# Patient Record
Sex: FEMALE | Race: WHITE | NOT HISPANIC OR LATINO | Employment: OTHER
[De-identification: names, ages, dates, MRNs, and addresses within clinical notes are randomized per-mention and may not be internally consistent; named-entity substitution may affect disease eponyms.]

---

## 2017-01-05 ENCOUNTER — CHARTING TRANS (OUTPATIENT)
Dept: OTHER | Age: 67
End: 2017-01-05

## 2017-01-10 ENCOUNTER — CHARTING TRANS (OUTPATIENT)
Dept: OTHER | Age: 67
End: 2017-01-10

## 2017-01-17 ENCOUNTER — CHARTING TRANS (OUTPATIENT)
Dept: INTERNAL MEDICINE | Age: 67
End: 2017-01-17

## 2017-01-17 ENCOUNTER — IMAGING SERVICES (OUTPATIENT)
Dept: OTHER | Age: 67
End: 2017-01-17

## 2017-01-18 ENCOUNTER — CHARTING TRANS (OUTPATIENT)
Dept: OTHER | Age: 67
End: 2017-01-18

## 2017-01-24 ENCOUNTER — IMAGING SERVICES (OUTPATIENT)
Dept: OTHER | Age: 67
End: 2017-01-24

## 2017-01-27 ENCOUNTER — CHARTING TRANS (OUTPATIENT)
Dept: INTERNAL MEDICINE | Age: 67
End: 2017-01-27

## 2017-01-27 ENCOUNTER — MYAURORA ACCOUNT LINK (OUTPATIENT)
Dept: OTHER | Age: 67
End: 2017-01-27

## 2017-01-27 ENCOUNTER — LAB SERVICES (OUTPATIENT)
Dept: OTHER | Age: 67
End: 2017-01-27

## 2017-01-27 LAB
BUN SERPL-MCNC: 17 MG/DL (ref 7–20)
CALCIUM SERPL-MCNC: 10 MG/DL (ref 8.6–10.6)
CHLORIDE SERPL-SCNC: 107 MMOL/L (ref 96–107)
CREATININE, SERUM: 0.9 MG/DL (ref 0.5–1.4)
GFR SERPL CREATININE-BSD FRML MDRD: >60 ML/MIN/{1.73M2}
GFR SERPL CREATININE-BSD FRML MDRD: >60 ML/MIN/{1.73M2}
GLUCOSE SERPL-MCNC: 109 MG/DL (ref 70–200)
HCO3 SERPL-SCNC: 26 MMOL/L (ref 22–32)
POTASSIUM SERPL-SCNC: 3.7 MMOL/L (ref 3.5–5.3)
SODIUM SERPL-SCNC: 144 MMOL/L (ref 136–146)

## 2017-04-25 ENCOUNTER — LAB SERVICES (OUTPATIENT)
Dept: OTHER | Age: 67
End: 2017-04-25

## 2017-04-25 ENCOUNTER — CHARTING TRANS (OUTPATIENT)
Dept: INTERNAL MEDICINE | Age: 67
End: 2017-04-25

## 2017-04-25 LAB
DIFFERENTIAL TYPE: ABNORMAL
HEMATOCRIT: 41.3 % (ref 34–45)
HEMOGLOBIN: 13.7 G/DL (ref 11.2–15.7)
INTERNATIONAL NORMALIZED RATIO: 1
LYMPH PERCENT: 37.5 % (ref 20.5–51.1)
LYMPHOCYTE ABSOLUTE #: 1.4 10*3/UL (ref 1.2–3.4)
MEAN CORPUSCULAR HGB CONCENTRATION: 33.2 % (ref 32–36)
MEAN CORPUSCULAR HGB: 31.5 PG (ref 27–34)
MEAN CORPUSCULAR VOLUME: 94.9 FL (ref 79–95)
MEAN PLATELET VOLUME: 9.7 FL (ref 8.6–12.4)
MIXED %: 6.8 % (ref 4.3–12.9)
MIXED ABSOLUTE #: 0.2 10*3/UL (ref 0.2–0.9)
NEUTROPHIL ABSOLUTE #: 2 10*3/UL (ref 1.4–6.5)
NEUTROPHIL PERCENT: 55.7 % (ref 34–73.5)
PLATELET COUNT: 254 10*3/UL (ref 150–400)
PROTHROMBIN TIME: 10.3 S (ref 9.8–11.8)
PTT: 27.5 S (ref 24–38)
RED BLOOD CELL COUNT: 4.35 10*6/UL (ref 3.7–5.2)
RED CELL DISTRIBUTION WIDTH: 13.8 % (ref 11.3–14.8)
WHITE BLOOD CELL COUNT: 3.6 10*3/UL (ref 4–10)

## 2017-05-03 ENCOUNTER — CHARTING TRANS (OUTPATIENT)
Dept: OTHER | Age: 67
End: 2017-05-03

## 2017-05-06 ENCOUNTER — CHARTING TRANS (OUTPATIENT)
Dept: URGENT CARE | Age: 67
End: 2017-05-06

## 2017-05-06 ENCOUNTER — MYAURORA ACCOUNT LINK (OUTPATIENT)
Dept: OTHER | Age: 67
End: 2017-05-06

## 2017-05-06 ASSESSMENT — PAIN SCALES - GENERAL: PAINLEVEL_OUTOF10: 5

## 2017-06-02 ENCOUNTER — CHARTING TRANS (OUTPATIENT)
Dept: INTERNAL MEDICINE | Age: 67
End: 2017-06-02

## 2017-06-02 ENCOUNTER — CHARTING TRANS (OUTPATIENT)
Dept: OTHER | Age: 67
End: 2017-06-02

## 2017-06-02 ENCOUNTER — IMAGING SERVICES (OUTPATIENT)
Dept: OTHER | Age: 67
End: 2017-06-02

## 2017-06-26 ENCOUNTER — CHARTING TRANS (OUTPATIENT)
Dept: OTHER | Age: 67
End: 2017-06-26

## 2017-06-26 ENCOUNTER — CHARTING TRANS (OUTPATIENT)
Dept: OTOLARYNGOLOGY | Age: 67
End: 2017-06-26

## 2017-06-26 ENCOUNTER — MYAURORA ACCOUNT LINK (OUTPATIENT)
Dept: OTHER | Age: 67
End: 2017-06-26

## 2017-08-14 ENCOUNTER — CHARTING TRANS (OUTPATIENT)
Dept: URGENT CARE | Age: 67
End: 2017-08-14

## 2017-08-14 ENCOUNTER — MYAURORA ACCOUNT LINK (OUTPATIENT)
Dept: OTHER | Age: 67
End: 2017-08-14

## 2017-08-28 ENCOUNTER — CHARTING TRANS (OUTPATIENT)
Dept: OTHER | Age: 67
End: 2017-08-28

## 2017-08-29 ENCOUNTER — CHARTING TRANS (OUTPATIENT)
Dept: INTERNAL MEDICINE | Age: 67
End: 2017-08-29

## 2017-09-30 ENCOUNTER — CHARTING TRANS (OUTPATIENT)
Dept: INTERNAL MEDICINE | Age: 67
End: 2017-09-30

## 2017-10-19 ENCOUNTER — CHARTING TRANS (OUTPATIENT)
Dept: OTHER | Age: 67
End: 2017-10-19

## 2017-11-09 ENCOUNTER — CHARTING TRANS (OUTPATIENT)
Dept: URGENT CARE | Age: 67
End: 2017-11-09

## 2017-11-09 ENCOUNTER — MYAURORA ACCOUNT LINK (OUTPATIENT)
Dept: OTHER | Age: 67
End: 2017-11-09

## 2017-11-09 ENCOUNTER — CHARTING TRANS (OUTPATIENT)
Dept: OTHER | Age: 67
End: 2017-11-09

## 2017-11-09 ASSESSMENT — PAIN SCALES - GENERAL: PAINLEVEL_OUTOF10: 5

## 2017-11-10 ENCOUNTER — CHARTING TRANS (OUTPATIENT)
Dept: OTHER | Age: 67
End: 2017-11-10

## 2017-11-13 ENCOUNTER — CHARTING TRANS (OUTPATIENT)
Dept: OTHER | Age: 67
End: 2017-11-13

## 2017-11-27 ENCOUNTER — CHARTING TRANS (OUTPATIENT)
Dept: OTHER | Age: 67
End: 2017-11-27

## 2017-11-28 ENCOUNTER — CHARTING TRANS (OUTPATIENT)
Dept: OTHER | Age: 67
End: 2017-11-28

## 2017-12-07 ENCOUNTER — CHARTING TRANS (OUTPATIENT)
Dept: OTHER | Age: 67
End: 2017-12-07

## 2017-12-18 ENCOUNTER — CHARTING TRANS (OUTPATIENT)
Dept: OTHER | Age: 67
End: 2017-12-18

## 2018-01-10 ENCOUNTER — CHARTING TRANS (OUTPATIENT)
Dept: OTHER | Age: 68
End: 2018-01-10

## 2018-01-16 ENCOUNTER — CHARTING TRANS (OUTPATIENT)
Dept: OTHER | Age: 68
End: 2018-01-16

## 2018-01-18 ENCOUNTER — CHARTING TRANS (OUTPATIENT)
Dept: OTHER | Age: 68
End: 2018-01-18

## 2018-01-19 ENCOUNTER — CHARTING TRANS (OUTPATIENT)
Dept: OTHER | Age: 68
End: 2018-01-19

## 2018-01-22 ENCOUNTER — CHARTING TRANS (OUTPATIENT)
Dept: OTHER | Age: 68
End: 2018-01-22

## 2018-01-22 ENCOUNTER — MYAURORA ACCOUNT LINK (OUTPATIENT)
Dept: OTHER | Age: 68
End: 2018-01-22

## 2018-05-21 ENCOUNTER — CHARTING TRANS (OUTPATIENT)
Dept: OTHER | Age: 68
End: 2018-05-21

## 2018-06-06 ENCOUNTER — CHARTING TRANS (OUTPATIENT)
Dept: OTHER | Age: 68
End: 2018-06-06

## 2018-06-06 ENCOUNTER — MYAURORA ACCOUNT LINK (OUTPATIENT)
Dept: OTHER | Age: 68
End: 2018-06-06

## 2018-06-06 ENCOUNTER — LAB SERVICES (OUTPATIENT)
Dept: OTHER | Age: 68
End: 2018-06-06

## 2018-06-06 LAB
BILIRUBIN URINE: NEGATIVE
BLOOD URINE: ABNORMAL
CLARITY: CLEAR
COLOR: YELLOW
GLUCOSE QUALITATIVE U: NEGATIVE
KETONES, URINE: NEGATIVE
LEUKOCYTE ESTERASE URINE: NEGATIVE
MUCOUS: ABNORMAL
NITRITE URINE: NEGATIVE
PH URINE: 6 (ref 5–7)
RED BLOOD CELLS URINE: ABNORMAL (ref 0–3)
SPECIFIC GRAVITY URINE: 1.02 (ref 1–1.03)
SQUAMOUS EPITHELIAL CELLS: ABNORMAL
URINE PROTEIN, QUAL (DIPSTICK): NEGATIVE
UROBILINOGEN URINE: 0.2
WHITE BLOOD CELLS URINE: ABNORMAL (ref 0–5)

## 2018-06-07 ENCOUNTER — CHARTING TRANS (OUTPATIENT)
Dept: OTHER | Age: 68
End: 2018-06-07

## 2018-06-08 LAB — FINAL REPORT: NORMAL

## 2018-06-11 ENCOUNTER — CHARTING TRANS (OUTPATIENT)
Dept: OTHER | Age: 68
End: 2018-06-11

## 2018-06-12 ENCOUNTER — CHARTING TRANS (OUTPATIENT)
Dept: OTHER | Age: 68
End: 2018-06-12

## 2018-06-13 PROBLEM — N13.2 URETERAL STONE WITH HYDRONEPHROSIS: Status: ACTIVE | Noted: 2018-06-13

## 2018-06-13 PROBLEM — N20.0 KIDNEY STONE: Status: ACTIVE | Noted: 2018-06-13

## 2018-06-14 ENCOUNTER — LAB SERVICES (OUTPATIENT)
Dept: OTHER | Age: 68
End: 2018-06-14

## 2018-06-14 LAB
ALBUMIN SERPL BCG-MCNC: 4.7 G/DL (ref 3.6–5.1)
ALP SERPL-CCNC: 52 U/L (ref 45–130)
ALT SERPL W/O P-5'-P-CCNC: 15 U/L (ref 7–34)
AST SERPL-CCNC: 14 U/L (ref 9–37)
BILIRUB SERPL-MCNC: 0.2 MG/DL (ref 0–1)
BUN SERPL-MCNC: 17 MG/DL (ref 7–20)
CALCIUM SERPL-MCNC: 9 MG/DL (ref 8.6–10.6)
CHLORIDE SERPL-SCNC: 107 MMOL/L (ref 96–107)
CHOLEST SERPL-MCNC: 247 MG/DL (ref 140–200)
CREAT SERPL-MCNC: 0.9 MG/DL (ref 0.5–1.4)
GFR SERPL CREATININE-BSD FRML MDRD: 59 ML/MIN/{1.73M2}
GFR SERPL CREATININE-BSD FRML MDRD: >60 ML/MIN/{1.73M2}
GLUCOSE P FAST SERPL-MCNC: 116 MG/DL (ref 60–100)
HCO3 SERPL-SCNC: 27 MMOL/L (ref 22–32)
HDLC SERPL-MCNC: 101 MG/DL
LDLC SERPL CALC-MCNC: 127 MG/DL (ref 0–100)
POTASSIUM SERPL-SCNC: 4.4 MMOL/L (ref 3.5–5.3)
PROT SERPL-MCNC: 6.7 G/DL (ref 6.2–8.1)
SODIUM SERPL-SCNC: 143 MMOL/L (ref 136–146)
TRIGL SERPL-MCNC: 90 MG/DL (ref 0–200)

## 2018-06-20 ENCOUNTER — LAB SERVICES (OUTPATIENT)
Dept: OTHER | Age: 68
End: 2018-06-20

## 2018-06-20 LAB
EST. AVERAGE GLUCOSE BLD GHB EST-MCNC: 121 MG/DL (ref 0–154)
HBA1C BLD-MCNC: 5.8 % (ref 4.2–6)

## 2018-06-26 ENCOUNTER — CHARTING TRANS (OUTPATIENT)
Dept: OTHER | Age: 68
End: 2018-06-26

## 2018-07-11 ENCOUNTER — LAB SERVICES (OUTPATIENT)
Dept: OTHER | Age: 68
End: 2018-07-11

## 2018-07-11 LAB
DIFFERENTIAL TYPE: ABNORMAL
HEMATOCRIT: 41.3 % (ref 34–45)
HEMOGLOBIN: 13.6 G/DL (ref 11.2–15.7)
LYMPH PERCENT: 30.8 % (ref 20.5–51.1)
LYMPHOCYTE ABSOLUTE #: 1.7 10*3/UL (ref 1.2–3.4)
MEAN CORPUSCULAR HGB CONCENTRATION: 32.9 % (ref 32–36)
MEAN CORPUSCULAR HGB: 31.9 PG (ref 27–34)
MEAN CORPUSCULAR VOLUME: 96.7 FL (ref 79–95)
MEAN PLATELET VOLUME: 9.7 FL (ref 8.6–12.4)
MIXED %: 6.3 % (ref 4.3–12.9)
MIXED ABSOLUTE #: 0.4 10*3/UL (ref 0.2–0.9)
NEUTROPHIL ABSOLUTE #: 3.5 10*3/UL (ref 1.4–6.5)
NEUTROPHIL PERCENT: 62.9 % (ref 34–73.5)
PLATELET COUNT: 240 10*3/UL (ref 150–400)
RED BLOOD CELL COUNT: 4.27 10*6/UL (ref 3.7–5.2)
RED CELL DISTRIBUTION WIDTH: 13.7 % (ref 11.3–14.8)
WHITE BLOOD CELL COUNT: 5.6 10*3/UL (ref 4–10)

## 2018-08-01 ENCOUNTER — LAB SERVICES (OUTPATIENT)
Dept: OTHER | Age: 68
End: 2018-08-01

## 2018-08-01 ENCOUNTER — MYAURORA ACCOUNT LINK (OUTPATIENT)
Dept: OTHER | Age: 68
End: 2018-08-01

## 2018-08-01 ENCOUNTER — CHARTING TRANS (OUTPATIENT)
Dept: OTHER | Age: 68
End: 2018-08-01

## 2018-08-03 LAB — AP REPORT: NORMAL

## 2018-11-16 ENCOUNTER — CHARTING TRANS (OUTPATIENT)
Dept: OTHER | Age: 68
End: 2018-11-16

## 2018-11-23 ENCOUNTER — IMAGING SERVICES (OUTPATIENT)
Dept: OTHER | Age: 68
End: 2018-11-23

## 2018-11-27 VITALS
OXYGEN SATURATION: 100 % | TEMPERATURE: 99.4 F | HEART RATE: 106 BPM | SYSTOLIC BLOOD PRESSURE: 129 MMHG | RESPIRATION RATE: 16 BRPM | DIASTOLIC BLOOD PRESSURE: 65 MMHG

## 2018-11-28 ENCOUNTER — MYAURORA ACCOUNT LINK (OUTPATIENT)
Dept: OTHER | Age: 68
End: 2018-11-28

## 2018-11-28 ENCOUNTER — CHARTING TRANS (OUTPATIENT)
Dept: OTHER | Age: 68
End: 2018-11-28

## 2018-11-28 VITALS
HEART RATE: 100 BPM | BODY MASS INDEX: 25.68 KG/M2 | TEMPERATURE: 97.7 F | DIASTOLIC BLOOD PRESSURE: 60 MMHG | SYSTOLIC BLOOD PRESSURE: 114 MMHG | WEIGHT: 136 LBS | HEIGHT: 61 IN

## 2018-11-28 VITALS
DIASTOLIC BLOOD PRESSURE: 64 MMHG | WEIGHT: 139 LBS | SYSTOLIC BLOOD PRESSURE: 130 MMHG | HEART RATE: 88 BPM | TEMPERATURE: 97.9 F | BODY MASS INDEX: 24.63 KG/M2 | HEIGHT: 63 IN | RESPIRATION RATE: 14 BRPM

## 2018-11-28 VITALS
HEART RATE: 100 BPM | HEIGHT: 61 IN | WEIGHT: 138 LBS | DIASTOLIC BLOOD PRESSURE: 60 MMHG | SYSTOLIC BLOOD PRESSURE: 110 MMHG | TEMPERATURE: 97.1 F | BODY MASS INDEX: 26.06 KG/M2

## 2018-11-28 VITALS
HEIGHT: 59 IN | DIASTOLIC BLOOD PRESSURE: 60 MMHG | HEART RATE: 92 BPM | SYSTOLIC BLOOD PRESSURE: 124 MMHG | TEMPERATURE: 96.6 F | BODY MASS INDEX: 28.43 KG/M2 | WEIGHT: 141 LBS

## 2018-11-28 VITALS
HEART RATE: 90 BPM | TEMPERATURE: 99.5 F | SYSTOLIC BLOOD PRESSURE: 123 MMHG | DIASTOLIC BLOOD PRESSURE: 70 MMHG | RESPIRATION RATE: 16 BRPM | OXYGEN SATURATION: 96 %

## 2018-11-28 VITALS — WEIGHT: 138 LBS | BODY MASS INDEX: 24.45 KG/M2 | HEIGHT: 63 IN

## 2018-11-28 VITALS
HEART RATE: 96 BPM | TEMPERATURE: 98.8 F | OXYGEN SATURATION: 99 % | SYSTOLIC BLOOD PRESSURE: 130 MMHG | RESPIRATION RATE: 16 BRPM | DIASTOLIC BLOOD PRESSURE: 70 MMHG

## 2018-11-29 ENCOUNTER — CHARTING TRANS (OUTPATIENT)
Dept: OTHER | Age: 68
End: 2018-11-29

## 2018-11-29 VITALS
HEART RATE: 80 BPM | HEIGHT: 61 IN | SYSTOLIC BLOOD PRESSURE: 122 MMHG | TEMPERATURE: 96 F | DIASTOLIC BLOOD PRESSURE: 64 MMHG | WEIGHT: 138 LBS | BODY MASS INDEX: 26.06 KG/M2

## 2018-11-29 VITALS
HEART RATE: 80 BPM | BODY MASS INDEX: 26.24 KG/M2 | SYSTOLIC BLOOD PRESSURE: 120 MMHG | HEIGHT: 61 IN | TEMPERATURE: 97.2 F | WEIGHT: 139 LBS | DIASTOLIC BLOOD PRESSURE: 62 MMHG

## 2018-12-04 VITALS — BODY MASS INDEX: 31.94 KG/M2 | HEIGHT: 55 IN | WEIGHT: 138 LBS

## 2019-01-01 ENCOUNTER — EXTERNAL RECORD (OUTPATIENT)
Dept: HEALTH INFORMATION MANAGEMENT | Facility: OTHER | Age: 69
End: 2019-01-01

## 2019-01-04 ENCOUNTER — TELEPHONE (OUTPATIENT)
Dept: OTOLARYNGOLOGY | Age: 69
End: 2019-01-04

## 2019-01-10 ENCOUNTER — OFFICE VISIT (OUTPATIENT)
Dept: OTOLARYNGOLOGY | Age: 69
End: 2019-01-10

## 2019-01-10 VITALS — HEIGHT: 59 IN | WEIGHT: 138 LBS | BODY MASS INDEX: 27.82 KG/M2

## 2019-01-10 DIAGNOSIS — H60.8X1 OTHER OTITIS EXTERNA, RIGHT EAR: ICD-10-CM

## 2019-01-10 DIAGNOSIS — D48.5 NEOPLASM OF UNCERTAIN BEHAVIOR OF SKIN: ICD-10-CM

## 2019-01-10 DIAGNOSIS — H61.899 LESION OF EAR CANAL: Primary | ICD-10-CM

## 2019-01-10 DIAGNOSIS — H93.8X1 OTHER SPECIFIED DISORDERS OF RIGHT EAR: ICD-10-CM

## 2019-01-10 PROCEDURE — 92504 EAR MICROSCOPY EXAMINATION: CPT | Performed by: OTOLARYNGOLOGY

## 2019-01-10 PROCEDURE — 99203 OFFICE O/P NEW LOW 30 MIN: CPT | Performed by: OTOLARYNGOLOGY

## 2019-01-10 PROCEDURE — 69105 BIOPSY OF EXTERNAL EAR CANAL: CPT | Performed by: OTOLARYNGOLOGY

## 2019-01-10 RX ORDER — GABAPENTIN 300 MG/1
300 CAPSULE ORAL
COMMUNITY
Start: 2016-08-22

## 2019-01-10 RX ORDER — ALENDRONATE SODIUM 70 MG/1
TABLET ORAL
COMMUNITY
End: 2023-01-11 | Stop reason: ALTCHOICE

## 2019-01-10 RX ORDER — LIDOCAINE 50 MG/G
5 PATCH TOPICAL
COMMUNITY
Start: 2016-07-26 | End: 2020-07-06 | Stop reason: ALTCHOICE

## 2019-01-10 RX ORDER — PANTOPRAZOLE SODIUM 40 MG/1
40 TABLET, DELAYED RELEASE ORAL
COMMUNITY
Start: 2013-03-13

## 2019-01-10 RX ORDER — DEXTROAMPHETAMINE SACCHARATE, AMPHETAMINE ASPARTATE MONOHYDRATE, DEXTROAMPHETAMINE SULFATE AND AMPHETAMINE SULFATE 7.5; 7.5; 7.5; 7.5 MG/1; MG/1; MG/1; MG/1
CAPSULE, EXTENDED RELEASE ORAL
COMMUNITY
Start: 2016-08-26

## 2019-01-10 RX ORDER — FLUTICASONE PROPIONATE 50 MCG
1 SPRAY, SUSPENSION (ML) NASAL
COMMUNITY
Start: 2017-04-25 | End: 2020-07-06 | Stop reason: ALTCHOICE

## 2019-01-10 RX ORDER — SUMATRIPTAN 100 MG/1
50 TABLET, FILM COATED ORAL
COMMUNITY
Start: 2016-07-30

## 2019-01-10 RX ORDER — OXCARBAZEPINE 150 MG/1
TABLET, FILM COATED ORAL
COMMUNITY
Start: 2018-11-16 | End: 2023-01-11 | Stop reason: ALTCHOICE

## 2019-01-10 RX ORDER — TOPIRAMATE 25 MG/1
TABLET ORAL
COMMUNITY
Start: 2016-03-29

## 2019-01-14 ENCOUNTER — OFFICE VISIT (OUTPATIENT)
Dept: OTOLARYNGOLOGY | Age: 69
End: 2019-01-14

## 2019-01-14 ENCOUNTER — TELEPHONE (OUTPATIENT)
Dept: OTOLARYNGOLOGY | Age: 69
End: 2019-01-14

## 2019-01-14 DIAGNOSIS — H61.899 LESION OF EAR CANAL: Primary | ICD-10-CM

## 2019-01-14 LAB — AP REPORT: NORMAL

## 2019-01-14 PROCEDURE — 92504 EAR MICROSCOPY EXAMINATION: CPT | Performed by: OTOLARYNGOLOGY

## 2019-01-14 PROCEDURE — 99214 OFFICE O/P EST MOD 30 MIN: CPT | Performed by: OTOLARYNGOLOGY

## 2019-01-14 RX ORDER — OFLOXACIN 3 MG/ML
4 SOLUTION AURICULAR (OTIC) DAILY
Qty: 1 BOTTLE | Refills: 0 | Status: SHIPPED | OUTPATIENT
Start: 2019-01-14 | End: 2019-01-21

## 2019-01-14 RX ORDER — HYDROCORTISONE VALERATE 2 MG/G
OINTMENT TOPICAL
COMMUNITY
Start: 2017-11-13 | End: 2023-01-11 | Stop reason: ALTCHOICE

## 2019-01-14 RX ORDER — AMANTADINE HYDROCHLORIDE 100 MG/1
100 CAPSULE, GELATIN COATED ORAL
COMMUNITY
End: 2020-07-06 | Stop reason: ALTCHOICE

## 2019-01-14 RX ORDER — BACLOFEN 10 MG/1
10 TABLET ORAL
COMMUNITY
End: 2020-07-06 | Stop reason: ALTCHOICE

## 2019-01-18 ENCOUNTER — APPOINTMENT (OUTPATIENT)
Dept: OTOLARYNGOLOGY | Age: 69
End: 2019-01-18

## 2019-01-18 ENCOUNTER — OFFICE VISIT (OUTPATIENT)
Dept: OTOLARYNGOLOGY | Age: 69
End: 2019-01-18

## 2019-01-18 VITALS — BODY MASS INDEX: 27.82 KG/M2 | HEIGHT: 59 IN | WEIGHT: 138 LBS

## 2019-01-18 DIAGNOSIS — H61.899 LESION OF EAR CANAL: Primary | ICD-10-CM

## 2019-01-18 DIAGNOSIS — L03.90 CELLULITIS, UNSPECIFIED CELLULITIS SITE: ICD-10-CM

## 2019-01-18 PROCEDURE — 99213 OFFICE O/P EST LOW 20 MIN: CPT | Performed by: OTOLARYNGOLOGY

## 2019-01-18 PROCEDURE — 92504 EAR MICROSCOPY EXAMINATION: CPT | Performed by: OTOLARYNGOLOGY

## 2019-01-21 ENCOUNTER — TELEPHONE (OUTPATIENT)
Dept: OTOLARYNGOLOGY | Age: 69
End: 2019-01-21

## 2019-01-21 DIAGNOSIS — H61.899 LESION OF EAR CANAL: Primary | ICD-10-CM

## 2019-01-24 ENCOUNTER — TELEPHONE (OUTPATIENT)
Dept: OTOLARYNGOLOGY | Age: 69
End: 2019-01-24

## 2019-01-25 ENCOUNTER — OFFICE VISIT (OUTPATIENT)
Dept: OTOLARYNGOLOGY | Age: 69
End: 2019-01-25

## 2019-01-25 VITALS — WEIGHT: 138 LBS | BODY MASS INDEX: 27.82 KG/M2 | HEIGHT: 59 IN

## 2019-01-25 DIAGNOSIS — H61.899 LESION OF EAR CANAL: Primary | ICD-10-CM

## 2019-01-25 PROCEDURE — 99213 OFFICE O/P EST LOW 20 MIN: CPT | Performed by: OTOLARYNGOLOGY

## 2019-01-25 PROCEDURE — 92504 EAR MICROSCOPY EXAMINATION: CPT | Performed by: OTOLARYNGOLOGY

## 2019-02-01 ENCOUNTER — OFFICE VISIT (OUTPATIENT)
Dept: OTOLARYNGOLOGY | Age: 69
End: 2019-02-01

## 2019-02-01 VITALS — WEIGHT: 138 LBS | BODY MASS INDEX: 27.87 KG/M2

## 2019-02-01 DIAGNOSIS — H61.899 LESION OF EAR CANAL: Primary | ICD-10-CM

## 2019-02-01 PROCEDURE — 92504 EAR MICROSCOPY EXAMINATION: CPT | Performed by: OTOLARYNGOLOGY

## 2019-02-01 PROCEDURE — 99213 OFFICE O/P EST LOW 20 MIN: CPT | Performed by: OTOLARYNGOLOGY

## 2019-02-11 ENCOUNTER — IMAGING SERVICES (OUTPATIENT)
Dept: OTHER | Age: 69
End: 2019-02-11

## 2019-02-18 ENCOUNTER — OFFICE VISIT (OUTPATIENT)
Dept: OTOLARYNGOLOGY | Age: 69
End: 2019-02-18

## 2019-02-18 VITALS — WEIGHT: 138 LBS | HEIGHT: 59 IN | BODY MASS INDEX: 27.82 KG/M2

## 2019-02-18 DIAGNOSIS — H61.899 LESION OF EAR CANAL: Primary | ICD-10-CM

## 2019-02-18 PROCEDURE — 99212 OFFICE O/P EST SF 10 MIN: CPT | Performed by: OTOLARYNGOLOGY

## 2019-02-18 PROCEDURE — 92504 EAR MICROSCOPY EXAMINATION: CPT | Performed by: OTOLARYNGOLOGY

## 2019-03-05 VITALS — BODY MASS INDEX: 27.88 KG/M2 | HEIGHT: 60 IN | WEIGHT: 142 LBS

## 2019-03-08 ENCOUNTER — OFFICE VISIT (OUTPATIENT)
Dept: OTOLARYNGOLOGY | Age: 69
End: 2019-03-08

## 2019-03-08 VITALS — HEIGHT: 59 IN | WEIGHT: 138 LBS | BODY MASS INDEX: 27.82 KG/M2

## 2019-03-08 DIAGNOSIS — H93.90 EAR LESION: Primary | ICD-10-CM

## 2019-03-08 PROCEDURE — 92504 EAR MICROSCOPY EXAMINATION: CPT | Performed by: OTOLARYNGOLOGY

## 2019-03-08 PROCEDURE — 99213 OFFICE O/P EST LOW 20 MIN: CPT | Performed by: OTOLARYNGOLOGY

## 2019-05-13 ENCOUNTER — OFFICE VISIT (OUTPATIENT)
Dept: OTOLARYNGOLOGY | Age: 69
End: 2019-05-13

## 2019-05-13 VITALS — HEIGHT: 59 IN | WEIGHT: 138 LBS | BODY MASS INDEX: 27.82 KG/M2

## 2019-05-13 DIAGNOSIS — H61.899 LESION OF EAR CANAL: Primary | ICD-10-CM

## 2019-05-13 PROCEDURE — 92504 EAR MICROSCOPY EXAMINATION: CPT | Performed by: OTOLARYNGOLOGY

## 2019-05-13 PROCEDURE — 99213 OFFICE O/P EST LOW 20 MIN: CPT | Performed by: OTOLARYNGOLOGY

## 2019-05-20 ENCOUNTER — WALK IN (OUTPATIENT)
Dept: URGENT CARE | Age: 69
End: 2019-05-20

## 2019-05-20 ENCOUNTER — IMAGING SERVICES (OUTPATIENT)
Dept: GENERAL RADIOLOGY | Age: 69
End: 2019-05-20
Attending: INTERNAL MEDICINE

## 2019-05-20 ENCOUNTER — TELEPHONE (OUTPATIENT)
Dept: INTERNAL MEDICINE | Age: 69
End: 2019-05-20

## 2019-05-20 DIAGNOSIS — M79.671 RIGHT FOOT PAIN: Primary | ICD-10-CM

## 2019-05-20 DIAGNOSIS — M79.671 RIGHT FOOT PAIN: ICD-10-CM

## 2019-05-20 PROCEDURE — 73630 X-RAY EXAM OF FOOT: CPT | Performed by: RADIOLOGY

## 2019-05-20 PROCEDURE — 99214 OFFICE O/P EST MOD 30 MIN: CPT | Performed by: INTERNAL MEDICINE

## 2019-05-20 RX ORDER — PREDNISONE 50 MG/1
50 TABLET ORAL DAILY
Qty: 5 TABLET | Refills: 0 | Status: SHIPPED | OUTPATIENT
Start: 2019-05-20 | End: 2019-05-25

## 2019-05-20 ASSESSMENT — PAIN SCALES - GENERAL: PAINLEVEL: 3-4

## 2019-05-31 SDOH — HEALTH STABILITY: MENTAL HEALTH: HOW OFTEN DO YOU HAVE A DRINK CONTAINING ALCOHOL?: NEVER

## 2019-06-03 ENCOUNTER — OFFICE VISIT (OUTPATIENT)
Dept: INTERNAL MEDICINE | Age: 69
End: 2019-06-03

## 2019-06-03 VITALS
WEIGHT: 138 LBS | TEMPERATURE: 97.5 F | SYSTOLIC BLOOD PRESSURE: 120 MMHG | RESPIRATION RATE: 20 BRPM | BODY MASS INDEX: 27.82 KG/M2 | HEART RATE: 84 BPM | DIASTOLIC BLOOD PRESSURE: 60 MMHG | HEIGHT: 59 IN

## 2019-06-03 DIAGNOSIS — M79.671 RIGHT FOOT PAIN: Primary | ICD-10-CM

## 2019-06-03 PROCEDURE — 99213 OFFICE O/P EST LOW 20 MIN: CPT | Performed by: INTERNAL MEDICINE

## 2019-06-10 ENCOUNTER — OFFICE VISIT (OUTPATIENT)
Dept: INTERNAL MEDICINE | Age: 69
End: 2019-06-10

## 2019-06-10 VITALS
BODY MASS INDEX: 27.82 KG/M2 | TEMPERATURE: 97.7 F | OXYGEN SATURATION: 97 % | SYSTOLIC BLOOD PRESSURE: 128 MMHG | RESPIRATION RATE: 16 BRPM | WEIGHT: 138 LBS | DIASTOLIC BLOOD PRESSURE: 68 MMHG | HEART RATE: 96 BPM | HEIGHT: 59 IN

## 2019-06-10 DIAGNOSIS — R73.01 IMPAIRED FASTING BLOOD SUGAR: ICD-10-CM

## 2019-06-10 DIAGNOSIS — E78.5 HYPERLIPIDEMIA, UNSPECIFIED HYPERLIPIDEMIA TYPE: ICD-10-CM

## 2019-06-10 DIAGNOSIS — R53.83 FATIGUE, UNSPECIFIED TYPE: ICD-10-CM

## 2019-06-10 DIAGNOSIS — Z00.00 MEDICARE ANNUAL WELLNESS VISIT, SUBSEQUENT: Primary | ICD-10-CM

## 2019-06-10 PROCEDURE — G0439 PPPS, SUBSEQ VISIT: HCPCS | Performed by: INTERNAL MEDICINE

## 2019-06-10 SDOH — HEALTH STABILITY: MENTAL HEALTH: HOW OFTEN DO YOU HAVE A DRINK CONTAINING ALCOHOL?: NEVER

## 2019-06-24 ENCOUNTER — OFFICE VISIT (OUTPATIENT)
Dept: PODIATRY | Age: 69
End: 2019-06-24
Attending: INTERNAL MEDICINE

## 2019-06-24 VITALS — BODY MASS INDEX: 27.09 KG/M2 | WEIGHT: 138 LBS | HEIGHT: 60 IN

## 2019-06-24 DIAGNOSIS — M84.30XA STRESS REACTION OF BONE: Primary | ICD-10-CM

## 2019-06-24 DIAGNOSIS — M79.671 RIGHT FOOT PAIN: ICD-10-CM

## 2019-06-24 PROCEDURE — L3260 AMBULATORY SURGICAL BOOT EAC: HCPCS | Performed by: PODIATRIST

## 2019-06-24 PROCEDURE — 29540 STRAPPING ANKLE &/FOOT: CPT | Performed by: PODIATRIST

## 2019-06-24 PROCEDURE — 99203 OFFICE O/P NEW LOW 30 MIN: CPT | Performed by: PODIATRIST

## 2019-07-17 ENCOUNTER — IMAGING SERVICES (OUTPATIENT)
Dept: GENERAL RADIOLOGY | Age: 69
End: 2019-07-17
Attending: PODIATRIST

## 2019-07-17 ENCOUNTER — OFFICE VISIT (OUTPATIENT)
Dept: PODIATRY | Age: 69
End: 2019-07-17

## 2019-07-17 DIAGNOSIS — M79.675 PAIN IN TOE OF LEFT FOOT: ICD-10-CM

## 2019-07-17 DIAGNOSIS — M79.674 PAIN IN TOES OF BOTH FEET: ICD-10-CM

## 2019-07-17 DIAGNOSIS — S92.502A FRACTURE OF FOURTH TOE, LEFT, CLOSED, INITIAL ENCOUNTER: Primary | ICD-10-CM

## 2019-07-17 DIAGNOSIS — M79.675 PAIN IN TOES OF BOTH FEET: ICD-10-CM

## 2019-07-17 DIAGNOSIS — M84.30XA STRESS REACTION OF BONE: ICD-10-CM

## 2019-07-17 PROCEDURE — 28510 TREATMENT OF TOE FRACTURE: CPT | Performed by: PODIATRIST

## 2019-07-17 PROCEDURE — 99213 OFFICE O/P EST LOW 20 MIN: CPT | Performed by: PODIATRIST

## 2019-07-17 PROCEDURE — 73660 X-RAY EXAM OF TOE(S): CPT | Performed by: RADIOLOGY

## 2019-07-18 ENCOUNTER — TELEPHONE (OUTPATIENT)
Dept: INTERNAL MEDICINE | Age: 69
End: 2019-07-18

## 2019-08-16 ENCOUNTER — OFFICE VISIT (OUTPATIENT)
Dept: OTOLARYNGOLOGY | Age: 69
End: 2019-08-16

## 2019-08-16 ENCOUNTER — EXTERNAL RECORD (OUTPATIENT)
Dept: HEALTH INFORMATION MANAGEMENT | Facility: OTHER | Age: 69
End: 2019-08-16

## 2019-08-16 VITALS — WEIGHT: 138 LBS | BODY MASS INDEX: 26.95 KG/M2

## 2019-08-16 DIAGNOSIS — H92.09 OTALGIA, UNSPECIFIED LATERALITY: Primary | ICD-10-CM

## 2019-08-16 DIAGNOSIS — R51.9 NONINTRACTABLE HEADACHE, UNSPECIFIED CHRONICITY PATTERN, UNSPECIFIED HEADACHE TYPE: ICD-10-CM

## 2019-08-16 PROCEDURE — 99213 OFFICE O/P EST LOW 20 MIN: CPT | Performed by: OTOLARYNGOLOGY

## 2019-08-16 PROCEDURE — 92504 EAR MICROSCOPY EXAMINATION: CPT | Performed by: OTOLARYNGOLOGY

## 2019-08-28 ENCOUNTER — OFFICE VISIT (OUTPATIENT)
Dept: PODIATRY | Age: 69
End: 2019-08-28

## 2019-08-28 ENCOUNTER — TELEPHONE (OUTPATIENT)
Dept: INTERNAL MEDICINE | Age: 69
End: 2019-08-28

## 2019-08-28 ENCOUNTER — LAB SERVICES (OUTPATIENT)
Dept: LAB | Age: 69
End: 2019-08-28

## 2019-08-28 VITALS — WEIGHT: 138 LBS | BODY MASS INDEX: 26.95 KG/M2

## 2019-08-28 DIAGNOSIS — M84.30XA STRESS REACTION OF BONE: ICD-10-CM

## 2019-08-28 DIAGNOSIS — M79.674 PAIN IN TOES OF BOTH FEET: ICD-10-CM

## 2019-08-28 DIAGNOSIS — M79.675 PAIN IN TOES OF BOTH FEET: ICD-10-CM

## 2019-08-28 DIAGNOSIS — E78.5 HYPERLIPIDEMIA, UNSPECIFIED HYPERLIPIDEMIA TYPE: ICD-10-CM

## 2019-08-28 DIAGNOSIS — M79.675 PAIN IN TOE OF LEFT FOOT: ICD-10-CM

## 2019-08-28 DIAGNOSIS — R73.01 IMPAIRED FASTING BLOOD SUGAR: ICD-10-CM

## 2019-08-28 DIAGNOSIS — S92.502A FRACTURE OF FOURTH TOE, LEFT, CLOSED, INITIAL ENCOUNTER: Primary | ICD-10-CM

## 2019-08-28 DIAGNOSIS — R53.83 FATIGUE, UNSPECIFIED TYPE: ICD-10-CM

## 2019-08-28 DIAGNOSIS — M79.671 RIGHT FOOT PAIN: ICD-10-CM

## 2019-08-28 LAB
ALBUMIN SERPL BCG-MCNC: 4.2 G/DL (ref 3.6–5.1)
ALP SERPL-CCNC: 52 U/L (ref 45–130)
ALT SERPL W/O P-5'-P-CCNC: 14 U/L (ref 7–34)
AST SERPL-CCNC: 15 U/L (ref 9–37)
BILIRUB SERPL-MCNC: 0.3 MG/DL (ref 0–1)
BUN SERPL-MCNC: 15 MG/DL (ref 7–20)
CALCIUM SERPL-MCNC: 9 MG/DL (ref 8.6–10.6)
CHLORIDE SERPL-SCNC: 108 MMOL/L (ref 96–107)
CHOLEST SERPL-MCNC: 236 MG/DL (ref 140–200)
CREAT SERPL-MCNC: 0.9 MG/DL (ref 0.5–1.4)
DIFFERENTIAL TYPE: ABNORMAL
EST. AVERAGE GLUCOSE BLD GHB EST-MCNC: 121 MG/DL (ref 0–154)
GFR SERPL CREATININE-BSD FRML MDRD: >60 ML/MIN/{1.73M2}
GFR SERPL CREATININE-BSD FRML MDRD: >60 ML/MIN/{1.73M2}
GLUCOSE P FAST SERPL-MCNC: 104 MG/DL (ref 60–100)
HBA1C MFR BLD: 5.9 % (ref 4.2–6)
HCO3 SERPL-SCNC: 26 MMOL/L (ref 22–32)
HDLC SERPL-MCNC: 96 MG/DL
HEMATOCRIT: 42.8 % (ref 34–45)
HEMOGLOBIN: 13.4 G/DL (ref 11.2–15.7)
LDLC SERPL CALC-MCNC: 125 MG/DL (ref 0–100)
LYMPH PERCENT: 30.7 % (ref 20.5–51.1)
LYMPHOCYTE ABSOLUTE #: 1.6 10*3/UL (ref 1.2–3.4)
MEAN CORPUSCULAR HGB CONCENTRATION: 31.3 % (ref 32–36)
MEAN CORPUSCULAR HGB: 31.2 PG (ref 27–34)
MEAN CORPUSCULAR VOLUME: 99.8 FL (ref 79–95)
MEAN PLATELET VOLUME: 9.7 FL (ref 8.6–12.4)
MIXED %: 10.3 % (ref 4.3–12.9)
MIXED ABSOLUTE #: 0.5 10*3/UL (ref 0.2–0.9)
NEUTROPHIL ABSOLUTE #: 3 10*3/UL (ref 1.4–6.5)
NEUTROPHIL PERCENT: 59 % (ref 34–73.5)
PLATELET COUNT: 269 10*3/UL (ref 150–400)
POTASSIUM SERPL-SCNC: 4.1 MMOL/L (ref 3.5–5.3)
PROT SERPL-MCNC: 6.2 G/DL (ref 6.2–8.1)
RED BLOOD CELL COUNT: 4.29 10*6/UL (ref 3.7–5.2)
RED CELL DISTRIBUTION WIDTH: 13.1 % (ref 11.3–14.8)
SODIUM SERPL-SCNC: 143 MMOL/L (ref 136–146)
TRIGL SERPL-MCNC: 71 MG/DL (ref 0–200)
TSH SERPL DL<=0.05 MIU/L-ACNC: 1.76 M[IU]/L (ref 0.3–4.82)
WHITE BLOOD CELL COUNT: 5.1 10*3/UL (ref 4–10)

## 2019-08-28 PROCEDURE — 99024 POSTOP FOLLOW-UP VISIT: CPT | Performed by: PODIATRIST

## 2019-08-28 PROCEDURE — 84443 ASSAY THYROID STIM HORMONE: CPT | Performed by: INTERNAL MEDICINE

## 2019-08-28 PROCEDURE — 80053 COMPREHEN METABOLIC PANEL: CPT | Performed by: INTERNAL MEDICINE

## 2019-08-28 PROCEDURE — 80061 LIPID PANEL: CPT | Performed by: INTERNAL MEDICINE

## 2019-08-28 PROCEDURE — 85025 COMPLETE CBC W/AUTO DIFF WBC: CPT | Performed by: INTERNAL MEDICINE

## 2019-08-28 PROCEDURE — 36415 COLL VENOUS BLD VENIPUNCTURE: CPT | Performed by: INTERNAL MEDICINE

## 2019-08-28 PROCEDURE — 83036 HEMOGLOBIN GLYCOSYLATED A1C: CPT | Performed by: INTERNAL MEDICINE

## 2019-10-14 ENCOUNTER — OFFICE VISIT (OUTPATIENT)
Dept: OTOLARYNGOLOGY | Age: 69
End: 2019-10-14

## 2019-10-14 VITALS — HEIGHT: 60 IN | BODY MASS INDEX: 27.09 KG/M2 | WEIGHT: 138 LBS

## 2019-10-14 DIAGNOSIS — H93.90 EAR LESION: ICD-10-CM

## 2019-10-14 DIAGNOSIS — H92.09 OTALGIA, UNSPECIFIED LATERALITY: Primary | ICD-10-CM

## 2019-10-14 PROCEDURE — 92504 EAR MICROSCOPY EXAMINATION: CPT | Performed by: OTOLARYNGOLOGY

## 2019-10-14 PROCEDURE — 99214 OFFICE O/P EST MOD 30 MIN: CPT | Performed by: OTOLARYNGOLOGY

## 2020-01-08 ENCOUNTER — EXTERNAL RECORD (OUTPATIENT)
Dept: HEALTH INFORMATION MANAGEMENT | Facility: OTHER | Age: 70
End: 2020-01-08

## 2020-06-04 RX ORDER — VALACYCLOVIR HYDROCHLORIDE 1 G/1
TABLET, FILM COATED ORAL
COMMUNITY
End: 2020-07-06 | Stop reason: ALTCHOICE

## 2020-06-04 RX ORDER — PREDNISONE 10 MG/1
TABLET ORAL
COMMUNITY
End: 2020-07-06 | Stop reason: ALTCHOICE

## 2020-06-04 RX ORDER — TRAMADOL HYDROCHLORIDE 50 MG/1
TABLET ORAL
COMMUNITY
End: 2020-07-06 | Stop reason: ALTCHOICE

## 2020-06-04 RX ORDER — OFLOXACIN 3 MG/ML
SOLUTION AURICULAR (OTIC)
COMMUNITY
End: 2020-07-06 | Stop reason: ALTCHOICE

## 2020-06-04 RX ORDER — HYDROCODONE BITARTRATE AND ACETAMINOPHEN 5; 325 MG/1; MG/1
TABLET ORAL
COMMUNITY
End: 2020-07-06 | Stop reason: ALTCHOICE

## 2020-06-04 RX ORDER — BACLOFEN 10 MG/1
TABLET ORAL
COMMUNITY

## 2020-06-04 RX ORDER — TAMSULOSIN HYDROCHLORIDE 0.4 MG/1
CAPSULE ORAL
COMMUNITY
End: 2020-07-06 | Stop reason: ALTCHOICE

## 2020-06-04 RX ORDER — SODIUM, POTASSIUM,MAG SULFATES 17.5-3.13G
SOLUTION, RECONSTITUTED, ORAL ORAL
COMMUNITY
End: 2020-07-06 | Stop reason: ALTCHOICE

## 2020-06-04 RX ORDER — SUCRALFATE 1 G/1
TABLET ORAL
COMMUNITY
End: 2020-07-06 | Stop reason: ALTCHOICE

## 2020-06-04 RX ORDER — PREDNISONE 50 MG/1
TABLET ORAL
COMMUNITY
End: 2020-07-06 | Stop reason: ALTCHOICE

## 2020-06-04 RX ORDER — ACETAMINOPHEN AND CODEINE PHOSPHATE 300; 30 MG/1; MG/1
TABLET ORAL
COMMUNITY
End: 2020-07-06 | Stop reason: ALTCHOICE

## 2020-06-04 RX ORDER — SULFAMETHOXAZOLE AND TRIMETHOPRIM 800; 160 MG/1; MG/1
TABLET ORAL
COMMUNITY
End: 2020-07-06 | Stop reason: ALTCHOICE

## 2020-06-04 RX ORDER — TOBRAMYCIN AND DEXAMETHASONE 3; 1 MG/ML; MG/ML
SUSPENSION/ DROPS OPHTHALMIC
COMMUNITY
End: 2020-07-06 | Stop reason: ALTCHOICE

## 2020-06-04 RX ORDER — CLINDAMYCIN PHOSPHATE 10 UG/ML
LOTION TOPICAL
COMMUNITY
End: 2020-07-06 | Stop reason: ALTCHOICE

## 2020-06-05 ENCOUNTER — V-VISIT (OUTPATIENT)
Dept: INTERNAL MEDICINE | Age: 70
End: 2020-06-05

## 2020-06-05 PROCEDURE — X1094 NO CHARGE VISIT: HCPCS | Performed by: INTERNAL MEDICINE

## 2020-06-08 ENCOUNTER — OFFICE VISIT (OUTPATIENT)
Dept: AUDIOLOGY | Age: 70
End: 2020-06-08
Attending: OTOLARYNGOLOGY

## 2020-06-08 ENCOUNTER — OFFICE VISIT (OUTPATIENT)
Dept: OTOLARYNGOLOGY | Age: 70
End: 2020-06-08

## 2020-06-08 VITALS — BODY MASS INDEX: 26.95 KG/M2 | WEIGHT: 138 LBS

## 2020-06-08 DIAGNOSIS — H93.8X9 SENSATION OF FULLNESS IN EAR, UNSPECIFIED LATERALITY: Primary | ICD-10-CM

## 2020-06-08 DIAGNOSIS — H69.90 DYSFUNCTION OF EUSTACHIAN TUBE, UNSPECIFIED LATERALITY: ICD-10-CM

## 2020-06-08 DIAGNOSIS — H90.3 SENSORINEURAL HEARING LOSS (SNHL) OF BOTH EARS: Primary | ICD-10-CM

## 2020-06-08 DIAGNOSIS — H61.20 IMPACTED CERUMEN, UNSPECIFIED LATERALITY: ICD-10-CM

## 2020-06-08 DIAGNOSIS — H93.13 SUBJECTIVE TINNITUS OF BOTH EARS: ICD-10-CM

## 2020-06-08 PROCEDURE — 99214 OFFICE O/P EST MOD 30 MIN: CPT | Performed by: OTOLARYNGOLOGY

## 2020-06-08 PROCEDURE — 92557 COMPREHENSIVE HEARING TEST: CPT | Performed by: AUDIOLOGIST

## 2020-06-08 PROCEDURE — 92567 TYMPANOMETRY: CPT | Performed by: AUDIOLOGIST

## 2020-06-08 PROCEDURE — 69210 REMOVE IMPACTED EAR WAX UNI: CPT | Performed by: OTOLARYNGOLOGY

## 2020-06-11 ENCOUNTER — APPOINTMENT (OUTPATIENT)
Dept: INTERNAL MEDICINE | Age: 70
End: 2020-06-11

## 2020-07-06 ENCOUNTER — OFFICE VISIT (OUTPATIENT)
Dept: INTERNAL MEDICINE | Age: 70
End: 2020-07-06

## 2020-07-06 ENCOUNTER — LAB SERVICES (OUTPATIENT)
Dept: LAB | Age: 70
End: 2020-07-06

## 2020-07-06 VITALS
BODY MASS INDEX: 26.7 KG/M2 | TEMPERATURE: 98.9 F | HEIGHT: 60 IN | RESPIRATION RATE: 16 BRPM | OXYGEN SATURATION: 95 % | WEIGHT: 136 LBS | HEART RATE: 101 BPM | SYSTOLIC BLOOD PRESSURE: 118 MMHG | DIASTOLIC BLOOD PRESSURE: 62 MMHG

## 2020-07-06 DIAGNOSIS — Z11.59 NEED FOR HEPATITIS C SCREENING TEST: ICD-10-CM

## 2020-07-06 DIAGNOSIS — Z00.00 MEDICARE ANNUAL WELLNESS VISIT, SUBSEQUENT: Primary | ICD-10-CM

## 2020-07-06 DIAGNOSIS — Z78.0 POST-MENOPAUSE: ICD-10-CM

## 2020-07-06 DIAGNOSIS — M81.0 OSTEOPOROSIS, UNSPECIFIED OSTEOPOROSIS TYPE, UNSPECIFIED PATHOLOGICAL FRACTURE PRESENCE: ICD-10-CM

## 2020-07-06 DIAGNOSIS — M77.12 LATERAL EPICONDYLITIS OF LEFT ELBOW: ICD-10-CM

## 2020-07-06 LAB — HEPATITIS C ANTIBODY: NEGATIVE

## 2020-07-06 PROCEDURE — G0472 HEP C SCREEN HIGH RISK/OTHER: HCPCS | Performed by: INTERNAL MEDICINE

## 2020-07-06 PROCEDURE — G0439 PPPS, SUBSEQ VISIT: HCPCS | Performed by: INTERNAL MEDICINE

## 2020-07-06 PROCEDURE — 36415 COLL VENOUS BLD VENIPUNCTURE: CPT | Performed by: INTERNAL MEDICINE

## 2020-07-06 SDOH — HEALTH STABILITY: MENTAL HEALTH: HOW OFTEN DO YOU HAVE A DRINK CONTAINING ALCOHOL?: NEVER

## 2020-07-06 SDOH — HEALTH STABILITY: MENTAL HEALTH
STRESS IS WHEN SOMEONE FEELS TENSE, NERVOUS, ANXIOUS, OR CAN'T SLEEP AT NIGHT BECAUSE THEIR MIND IS TROUBLED. HOW STRESSED ARE YOU?: NOT AT ALL

## 2020-07-06 SDOH — HEALTH STABILITY: PHYSICAL HEALTH: ON AVERAGE, HOW MANY DAYS PER WEEK DO YOU ENGAGE IN MODERATE TO STRENUOUS EXERCISE (LIKE A BRISK WALK)?: 0 DAYS

## 2020-07-06 ASSESSMENT — COGNITIVE AND FUNCTIONAL STATUS - GENERAL
DO YOU HAVE DIFFICULTY DRESSING OR BATHING: NO
BECAUSE OF A PHYSICAL, MENTAL, OR EMOTIONAL CONDITION, DO YOU HAVE DIFFICULTY DOING ERRANDS ALONE: NO
BECAUSE OF A PHYSICAL, MENTAL, OR EMOTIONAL CONDITION, DO YOU HAVE SERIOUS DIFFICULTY CONCENTRATING, REMEMBERING OR MAKING DECISIONS: NO
DO YOU HAVE SERIOUS DIFFICULTY WALKING OR CLIMBING STAIRS: NO

## 2020-07-06 ASSESSMENT — PATIENT HEALTH QUESTIONNAIRE - PHQ9
CLINICAL INTERPRETATION OF PHQ9 SCORE: NO FURTHER SCREENING NEEDED
SUM OF ALL RESPONSES TO PHQ9 QUESTIONS 1 AND 2: 0
1. LITTLE INTEREST OR PLEASURE IN DOING THINGS: NOT AT ALL
SUM OF ALL RESPONSES TO PHQ9 QUESTIONS 1 AND 2: 0
CLINICAL INTERPRETATION OF PHQ2 SCORE: NO FURTHER SCREENING NEEDED
2. FEELING DOWN, DEPRESSED OR HOPELESS: NOT AT ALL

## 2020-07-06 ASSESSMENT — ENCOUNTER SYMPTOMS
HEMATOLOGIC/LYMPHATIC NEGATIVE: 1
PSYCHIATRIC NEGATIVE: 1
CONSTITUTIONAL NEGATIVE: 1
GASTROINTESTINAL NEGATIVE: 1
RESPIRATORY NEGATIVE: 1
EYES NEGATIVE: 1

## 2020-07-06 ASSESSMENT — MINI COG
TOTAL SCORE: 3  NEGATIVE FOR COGNITIVE IMPAIRMENT (NO NEED TO SCORE CDT)
ABLE TO REPEAT THE 3 WORDS GIVEN PREVIOUSLY?: APPLE;WATCH;PENNY
PATIENT WAS GIVEN REPEAT BACK WORDS FROM VERSION: IMMEDIATE REPEAT BACK - APPLE WATCH PENNY
PATIENT ABLE TO FILL IN THE CLOCK FACE WITH 10 MINUTES PAST 11 O'CLOCK?: YES, CLOCK IS CORRECT

## 2020-08-26 ENCOUNTER — IMAGING SERVICES (OUTPATIENT)
Dept: GENERAL RADIOLOGY | Age: 70
End: 2020-08-26
Attending: INTERNAL MEDICINE

## 2020-08-26 ENCOUNTER — OFFICE VISIT (OUTPATIENT)
Dept: INTERNAL MEDICINE | Age: 70
End: 2020-08-26
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
## 2020-08-26 DIAGNOSIS — M25.562 ACUTE PAIN OF LEFT KNEE: ICD-10-CM

## 2020-08-26 DIAGNOSIS — M25.562 ACUTE PAIN OF LEFT KNEE: Primary | ICD-10-CM

## 2020-08-26 PROCEDURE — 73590 X-RAY EXAM OF LOWER LEG: CPT | Performed by: RADIOLOGY

## 2020-08-26 PROCEDURE — 99214 OFFICE O/P EST MOD 30 MIN: CPT | Performed by: INTERNAL MEDICINE

## 2020-08-26 PROCEDURE — 73564 X-RAY EXAM KNEE 4 OR MORE: CPT | Performed by: RADIOLOGY

## 2020-08-26 SDOH — HEALTH STABILITY: MENTAL HEALTH: HOW OFTEN DO YOU HAVE A DRINK CONTAINING ALCOHOL?: NEVER

## 2020-08-26 ASSESSMENT — PAIN SCALES - GENERAL: PAINLEVEL: 0

## 2020-08-26 ASSESSMENT — PATIENT HEALTH QUESTIONNAIRE - PHQ9
CLINICAL INTERPRETATION OF PHQ2 SCORE: NO FURTHER SCREENING NEEDED
2. FEELING DOWN, DEPRESSED OR HOPELESS: NOT AT ALL
SUM OF ALL RESPONSES TO PHQ9 QUESTIONS 1 AND 2: 0
SUM OF ALL RESPONSES TO PHQ9 QUESTIONS 1 AND 2: 0
1. LITTLE INTEREST OR PLEASURE IN DOING THINGS: NOT AT ALL
CLINICAL INTERPRETATION OF PHQ9 SCORE: NO FURTHER SCREENING NEEDED
CLINICAL INTERPRETATION OF PHQ2 SCORE: NO FURTHER SCREENING NEEDED
SUM OF ALL RESPONSES TO PHQ9 QUESTIONS 1 AND 2: 0
2. FEELING DOWN, DEPRESSED OR HOPELESS: NOT AT ALL
1. LITTLE INTEREST OR PLEASURE IN DOING THINGS: NOT AT ALL

## 2020-09-01 ENCOUNTER — EXTERNAL RECORD (OUTPATIENT)
Dept: HEALTH INFORMATION MANAGEMENT | Facility: OTHER | Age: 70
End: 2020-09-01

## 2020-12-17 ENCOUNTER — TELEPHONE (OUTPATIENT)
Dept: INTERNAL MEDICINE | Age: 70
End: 2020-12-17

## 2021-01-11 ENCOUNTER — EXTERNAL RECORD (OUTPATIENT)
Dept: HEALTH INFORMATION MANAGEMENT | Facility: OTHER | Age: 71
End: 2021-01-11

## 2021-02-01 PROBLEM — S82.841D: Status: ACTIVE | Noted: 2021-02-01

## 2021-05-04 ENCOUNTER — OFFICE VISIT (OUTPATIENT)
Dept: INTERNAL MEDICINE | Age: 71
End: 2021-05-04

## 2021-05-04 ENCOUNTER — IMAGING SERVICES (OUTPATIENT)
Dept: GENERAL RADIOLOGY | Age: 71
End: 2021-05-04
Attending: INTERNAL MEDICINE

## 2021-05-04 DIAGNOSIS — M95.2 DEFORMITY OF SKULL: ICD-10-CM

## 2021-05-04 DIAGNOSIS — H61.21 IMPACTED CERUMEN OF RIGHT EAR: Primary | ICD-10-CM

## 2021-05-04 PROCEDURE — 99214 OFFICE O/P EST MOD 30 MIN: CPT | Performed by: INTERNAL MEDICINE

## 2021-05-04 PROCEDURE — 70260 X-RAY EXAM OF SKULL: CPT | Performed by: RADIOLOGY

## 2021-05-04 RX ORDER — ASPIRIN 325 MG
81 TABLET ORAL
COMMUNITY
Start: 2020-12-24

## 2021-05-04 ASSESSMENT — PATIENT HEALTH QUESTIONNAIRE - PHQ9
2. FEELING DOWN, DEPRESSED OR HOPELESS: NOT AT ALL
CLINICAL INTERPRETATION OF PHQ9 SCORE: NO FURTHER SCREENING NEEDED
1. LITTLE INTEREST OR PLEASURE IN DOING THINGS: NOT AT ALL
SUM OF ALL RESPONSES TO PHQ9 QUESTIONS 1 AND 2: 0
CLINICAL INTERPRETATION OF PHQ2 SCORE: NO FURTHER SCREENING NEEDED
SUM OF ALL RESPONSES TO PHQ9 QUESTIONS 1 AND 2: 0

## 2021-05-04 ASSESSMENT — PAIN SCALES - GENERAL: PAINLEVEL: 0

## 2021-05-17 PROBLEM — M79.674 PAIN IN RIGHT TOE(S): Status: ACTIVE | Noted: 2021-05-17

## 2021-05-17 PROBLEM — M25.559 HIP PAIN: Status: ACTIVE | Noted: 2021-05-17

## 2021-05-25 VITALS
HEART RATE: 94 BPM | TEMPERATURE: 97.8 F | TEMPERATURE: 97 F | RESPIRATION RATE: 16 BRPM | HEART RATE: 100 BPM | SYSTOLIC BLOOD PRESSURE: 130 MMHG | DIASTOLIC BLOOD PRESSURE: 64 MMHG | OXYGEN SATURATION: 97 % | HEIGHT: 59 IN | SYSTOLIC BLOOD PRESSURE: 128 MMHG | OXYGEN SATURATION: 97 % | HEIGHT: 60 IN | DIASTOLIC BLOOD PRESSURE: 68 MMHG | SYSTOLIC BLOOD PRESSURE: 121 MMHG | WEIGHT: 142 LBS | OXYGEN SATURATION: 98 % | RESPIRATION RATE: 16 BRPM | BODY MASS INDEX: 27.09 KG/M2 | HEART RATE: 87 BPM | TEMPERATURE: 98.6 F | BODY MASS INDEX: 28.63 KG/M2 | DIASTOLIC BLOOD PRESSURE: 70 MMHG | RESPIRATION RATE: 16 BRPM | WEIGHT: 138 LBS

## 2021-08-24 ENCOUNTER — EXTERNAL RECORD (OUTPATIENT)
Dept: HEALTH INFORMATION MANAGEMENT | Facility: OTHER | Age: 71
End: 2021-08-24

## 2021-08-31 ENCOUNTER — OFFICE VISIT (OUTPATIENT)
Dept: INTERNAL MEDICINE | Age: 71
End: 2021-08-31

## 2021-08-31 VITALS
HEIGHT: 59 IN | WEIGHT: 134 LBS | DIASTOLIC BLOOD PRESSURE: 74 MMHG | BODY MASS INDEX: 27.01 KG/M2 | OXYGEN SATURATION: 98 % | HEART RATE: 99 BPM | RESPIRATION RATE: 16 BRPM | SYSTOLIC BLOOD PRESSURE: 122 MMHG | TEMPERATURE: 97.1 F

## 2021-08-31 DIAGNOSIS — Z13.820 SCREENING FOR OSTEOPOROSIS: ICD-10-CM

## 2021-08-31 DIAGNOSIS — E78.00 PURE HYPERCHOLESTEROLEMIA: ICD-10-CM

## 2021-08-31 DIAGNOSIS — Z78.0 POST-MENOPAUSAL: ICD-10-CM

## 2021-08-31 DIAGNOSIS — M79.622 LEFT UPPER ARM PAIN: ICD-10-CM

## 2021-08-31 DIAGNOSIS — Z00.00 MEDICARE ANNUAL WELLNESS VISIT, SUBSEQUENT: Primary | ICD-10-CM

## 2021-08-31 PROCEDURE — G0439 PPPS, SUBSEQ VISIT: HCPCS | Performed by: INTERNAL MEDICINE

## 2021-08-31 RX ORDER — CHLORHEXIDINE GLUCONATE ORAL RINSE 1.2 MG/ML
SOLUTION DENTAL
COMMUNITY
Start: 2021-06-18 | End: 2023-01-11 | Stop reason: ALTCHOICE

## 2021-08-31 ASSESSMENT — PATIENT HEALTH QUESTIONNAIRE - PHQ9
CLINICAL INTERPRETATION OF PHQ2 SCORE: NO FURTHER SCREENING NEEDED
1. LITTLE INTEREST OR PLEASURE IN DOING THINGS: NOT AT ALL
SUM OF ALL RESPONSES TO PHQ9 QUESTIONS 1 AND 2: 0
SUM OF ALL RESPONSES TO PHQ9 QUESTIONS 1 AND 2: 0
CLINICAL INTERPRETATION OF PHQ9 SCORE: NO FURTHER SCREENING NEEDED
2. FEELING DOWN, DEPRESSED OR HOPELESS: NOT AT ALL

## 2021-08-31 ASSESSMENT — COGNITIVE AND FUNCTIONAL STATUS - GENERAL
DO YOU HAVE SERIOUS DIFFICULTY WALKING OR CLIMBING STAIRS: NO
DO YOU HAVE DIFFICULTY DRESSING OR BATHING: NO
BECAUSE OF A PHYSICAL, MENTAL, OR EMOTIONAL CONDITION, DO YOU HAVE DIFFICULTY DOING ERRANDS ALONE: NO
BECAUSE OF A PHYSICAL, MENTAL, OR EMOTIONAL CONDITION, DO YOU HAVE SERIOUS DIFFICULTY CONCENTRATING, REMEMBERING OR MAKING DECISIONS: NO

## 2021-08-31 ASSESSMENT — ENCOUNTER SYMPTOMS
PSYCHIATRIC NEGATIVE: 1
HEMATOLOGIC/LYMPHATIC NEGATIVE: 1
EYES NEGATIVE: 1
RESPIRATORY NEGATIVE: 1
GASTROINTESTINAL NEGATIVE: 1
CONSTITUTIONAL NEGATIVE: 1

## 2021-08-31 ASSESSMENT — MINI COG
PATIENT ABLE TO REPEAT THE 3 WORDS GIVEN PREVIOUSLY?: WAS ABLE TO REPEAT BACK 3 WORDS CORRECTLY
PATIENT WAS GIVEN REPEAT BACK WORDS FROM VERSION: 1 - BANANA SUNRISE CHAIR

## 2021-09-02 ENCOUNTER — ADVANCED DIRECTIVES (OUTPATIENT)
Dept: HEALTH INFORMATION MANAGEMENT | Facility: OTHER | Age: 71
End: 2021-09-02

## 2022-03-07 ENCOUNTER — OFFICE VISIT (OUTPATIENT)
Dept: OTOLARYNGOLOGY | Age: 72
End: 2022-03-07

## 2022-03-07 DIAGNOSIS — H61.20 IMPACTED CERUMEN, UNSPECIFIED LATERALITY: ICD-10-CM

## 2022-03-07 DIAGNOSIS — H93.8X9 SENSATION OF FULLNESS IN EAR, UNSPECIFIED LATERALITY: Primary | ICD-10-CM

## 2022-03-07 PROCEDURE — 69210 REMOVE IMPACTED EAR WAX UNI: CPT | Performed by: OTOLARYNGOLOGY

## 2022-03-07 PROCEDURE — 99214 OFFICE O/P EST MOD 30 MIN: CPT | Performed by: OTOLARYNGOLOGY

## 2022-08-23 ENCOUNTER — EXTERNAL RECORD (OUTPATIENT)
Dept: HEALTH INFORMATION MANAGEMENT | Facility: OTHER | Age: 72
End: 2022-08-23

## 2022-09-06 ENCOUNTER — TELEPHONE (OUTPATIENT)
Dept: OTHER | Age: 72
End: 2022-09-06

## 2022-10-12 ENCOUNTER — OFFICE VISIT (OUTPATIENT)
Dept: AUDIOLOGY | Age: 72
End: 2022-10-12
Attending: PHYSICIAN ASSISTANT

## 2022-10-12 ENCOUNTER — OFFICE VISIT (OUTPATIENT)
Dept: OTOLARYNGOLOGY | Age: 72
End: 2022-10-12

## 2022-10-12 DIAGNOSIS — H90.3 SENSORINEURAL HEARING LOSS (SNHL) OF BOTH EARS: ICD-10-CM

## 2022-10-12 DIAGNOSIS — H90.3 SENSORINEURAL HEARING LOSS, BILATERAL: Primary | ICD-10-CM

## 2022-10-12 DIAGNOSIS — H69.93 DYSFUNCTION OF BOTH EUSTACHIAN TUBES: Primary | ICD-10-CM

## 2022-10-12 PROCEDURE — 99214 OFFICE O/P EST MOD 30 MIN: CPT | Performed by: PHYSICIAN ASSISTANT

## 2022-10-12 PROCEDURE — 92504 EAR MICROSCOPY EXAMINATION: CPT | Performed by: PHYSICIAN ASSISTANT

## 2022-10-12 PROCEDURE — 92557 COMPREHENSIVE HEARING TEST: CPT | Performed by: AUDIOLOGIST

## 2022-10-12 PROCEDURE — 92567 TYMPANOMETRY: CPT | Performed by: AUDIOLOGIST

## 2022-10-12 RX ORDER — FLUTICASONE PROPIONATE 50 MCG
SPRAY, SUSPENSION (ML) NASAL
Qty: 48 G | Refills: 3 | Status: SHIPPED | OUTPATIENT
Start: 2022-10-12

## 2022-10-12 RX ORDER — FLUTICASONE PROPIONATE 50 MCG
2 SPRAY, SUSPENSION (ML) NASAL DAILY
Qty: 16 G | Refills: 1 | Status: SHIPPED | OUTPATIENT
Start: 2022-10-12 | End: 2022-10-12

## 2022-10-14 ENCOUNTER — TELEPHONE (OUTPATIENT)
Dept: INTERNAL MEDICINE | Age: 72
End: 2022-10-14

## 2022-11-06 ENCOUNTER — E-ADVICE (OUTPATIENT)
Dept: INTERNAL MEDICINE | Age: 72
End: 2022-11-06

## 2022-11-08 ENCOUNTER — TELEPHONE (OUTPATIENT)
Dept: INTERNAL MEDICINE | Age: 72
End: 2022-11-08

## 2023-01-11 ENCOUNTER — OFFICE VISIT (OUTPATIENT)
Dept: INTERNAL MEDICINE | Age: 73
End: 2023-01-11

## 2023-01-11 ENCOUNTER — IMAGING SERVICES (OUTPATIENT)
Dept: GENERAL RADIOLOGY | Age: 73
End: 2023-01-11
Attending: INTERNAL MEDICINE

## 2023-01-11 VITALS
SYSTOLIC BLOOD PRESSURE: 120 MMHG | HEIGHT: 59 IN | BODY MASS INDEX: 26.61 KG/M2 | HEART RATE: 91 BPM | TEMPERATURE: 97.3 F | DIASTOLIC BLOOD PRESSURE: 68 MMHG | WEIGHT: 132 LBS | RESPIRATION RATE: 16 BRPM | OXYGEN SATURATION: 98 %

## 2023-01-11 DIAGNOSIS — M25.561 CHRONIC PAIN OF RIGHT KNEE: ICD-10-CM

## 2023-01-11 DIAGNOSIS — G89.29 CHRONIC PAIN OF RIGHT KNEE: ICD-10-CM

## 2023-01-11 DIAGNOSIS — Z12.11 SCREEN FOR COLON CANCER: ICD-10-CM

## 2023-01-11 DIAGNOSIS — Z00.00 MEDICARE ANNUAL WELLNESS VISIT, SUBSEQUENT: Primary | ICD-10-CM

## 2023-01-11 DIAGNOSIS — G35 MULTIPLE SCLEROSIS (CMD): ICD-10-CM

## 2023-01-11 DIAGNOSIS — E78.00 PURE HYPERCHOLESTEROLEMIA: ICD-10-CM

## 2023-01-11 PROCEDURE — 99213 OFFICE O/P EST LOW 20 MIN: CPT | Performed by: INTERNAL MEDICINE

## 2023-01-11 PROCEDURE — G0439 PPPS, SUBSEQ VISIT: HCPCS | Performed by: INTERNAL MEDICINE

## 2023-01-11 PROCEDURE — 73564 X-RAY EXAM KNEE 4 OR MORE: CPT | Performed by: RADIOLOGY

## 2023-01-11 RX ORDER — PREDNISOLONE ACETATE 10 MG/ML
SUSPENSION/ DROPS OPHTHALMIC
COMMUNITY

## 2023-01-11 SDOH — HEALTH STABILITY: PHYSICAL HEALTH: ON AVERAGE, HOW MANY MINUTES DO YOU ENGAGE IN EXERCISE AT THIS LEVEL?: 0 MIN

## 2023-01-11 SDOH — HEALTH STABILITY: PHYSICAL HEALTH: ON AVERAGE, HOW MANY DAYS PER WEEK DO YOU ENGAGE IN MODERATE TO STRENUOUS EXERCISE (LIKE A BRISK WALK)?: 0 DAYS

## 2023-01-11 ASSESSMENT — PATIENT HEALTH QUESTIONNAIRE - PHQ9
SUM OF ALL RESPONSES TO PHQ9 QUESTIONS 1 AND 2: 0
1. LITTLE INTEREST OR PLEASURE IN DOING THINGS: NOT AT ALL
2. FEELING DOWN, DEPRESSED OR HOPELESS: NOT AT ALL
SUM OF ALL RESPONSES TO PHQ9 QUESTIONS 1 AND 2: 0
CLINICAL INTERPRETATION OF PHQ2 SCORE: NO FURTHER SCREENING NEEDED

## 2023-01-11 ASSESSMENT — MINI COG
PATIENT WAS GIVEN REPEAT BACK WORDS FROM VERSION: 1 - BANANA SUNRISE CHAIR
PATIENT ABLE TO REPEAT THE 3 WORDS GIVEN PREVIOUSLY?: WAS ABLE TO REPEAT BACK 3 WORDS CORRECTLY

## 2023-01-11 ASSESSMENT — ENCOUNTER SYMPTOMS
GASTROINTESTINAL NEGATIVE: 1
FATIGUE: 1
PSYCHIATRIC NEGATIVE: 1
EYES NEGATIVE: 1
RESPIRATORY NEGATIVE: 1
NEUROLOGICAL NEGATIVE: 1

## 2023-02-22 ENCOUNTER — EXTERNAL RECORD (OUTPATIENT)
Dept: HEALTH INFORMATION MANAGEMENT | Facility: OTHER | Age: 73
End: 2023-02-22

## 2023-04-05 ENCOUNTER — OFFICE VISIT (OUTPATIENT)
Dept: OTOLARYNGOLOGY | Age: 73
End: 2023-04-05

## 2023-04-05 ENCOUNTER — OFFICE VISIT (OUTPATIENT)
Dept: AUDIOLOGY | Age: 73
End: 2023-04-05

## 2023-04-05 VITALS — WEIGHT: 138 LBS | BODY MASS INDEX: 27.82 KG/M2 | HEIGHT: 59 IN

## 2023-04-05 DIAGNOSIS — M26.629 TMJ PAIN DYSFUNCTION SYNDROME: ICD-10-CM

## 2023-04-05 DIAGNOSIS — H91.90 HEARING LOSS, UNSPECIFIED HEARING LOSS TYPE, UNSPECIFIED LATERALITY: ICD-10-CM

## 2023-04-05 DIAGNOSIS — H90.3 ASYMMETRIC SNHL (SENSORINEURAL HEARING LOSS): Primary | ICD-10-CM

## 2023-04-05 DIAGNOSIS — H90.3 ASYMMETRICAL SENSORINEURAL HEARING LOSS: Primary | ICD-10-CM

## 2023-04-05 DIAGNOSIS — H69.92 DYSFUNCTION OF LEFT EUSTACHIAN TUBE: ICD-10-CM

## 2023-04-05 DIAGNOSIS — H91.8X3 ASYMMETRICAL HEARING LOSS: ICD-10-CM

## 2023-04-05 PROCEDURE — 92557 COMPREHENSIVE HEARING TEST: CPT | Performed by: AUDIOLOGIST

## 2023-04-05 PROCEDURE — 31231 NASAL ENDOSCOPY DX: CPT | Performed by: OTOLARYNGOLOGY

## 2023-04-05 PROCEDURE — 99215 OFFICE O/P EST HI 40 MIN: CPT | Performed by: OTOLARYNGOLOGY

## 2023-04-05 PROCEDURE — 92567 TYMPANOMETRY: CPT | Performed by: AUDIOLOGIST

## 2023-04-05 RX ORDER — UBIDECARENONE 75 MG
CAPSULE ORAL
COMMUNITY

## 2023-04-05 RX ORDER — OXCARBAZEPINE 150 MG/1
TABLET, FILM COATED ORAL
COMMUNITY

## 2023-04-05 RX ORDER — CHOLECALCIFEROL (VITAMIN D3) 1250 MCG
CAPSULE ORAL
COMMUNITY

## 2023-04-05 RX ORDER — PREDNISONE 10 MG/1
TABLET ORAL
Qty: 56 TABLET | Refills: 0 | Status: SHIPPED | OUTPATIENT
Start: 2023-04-05 | End: 2023-04-18

## 2023-04-05 RX ORDER — METHYLPREDNISOLONE 4 MG/1
4 TABLET ORAL SEE ADMIN INSTRUCTIONS
Qty: 21 TABLET | Refills: 0 | Status: SHIPPED | OUTPATIENT
Start: 2023-04-05 | End: 2023-04-05 | Stop reason: ALTCHOICE

## 2023-04-05 RX ORDER — BENZONATATE 200 MG/1
200 CAPSULE ORAL
COMMUNITY
Start: 2023-03-30 | End: 2023-04-06

## 2023-04-05 RX ORDER — DEXTROAMPHETAMINE SULFATE, DEXTROAMPHETAMINE SACCHARATE, AMPHETAMINE ASPARTATE MONOHYDRATE, AND AMPHETAMINE SULFATE 6.25; 6.25; 6.25; 6.25 MG/1; MG/1; MG/1; MG/1
CAPSULE, EXTENDED RELEASE ORAL EVERY 24 HOURS
COMMUNITY
Start: 2023-03-01

## 2023-04-05 RX ORDER — CLOTRIMAZOLE 10 MG/1
10 LOZENGE ORAL; TOPICAL
Qty: 35 TROCHE | Refills: 0 | Status: SHIPPED | OUTPATIENT
Start: 2023-04-05 | End: 2023-04-12

## 2023-04-05 RX ORDER — DOXYCYCLINE HYCLATE 100 MG
100 TABLET ORAL 2 TIMES DAILY
Qty: 28 TABLET | Refills: 0 | Status: SHIPPED | OUTPATIENT
Start: 2023-04-05 | End: 2023-04-19

## 2023-04-07 ENCOUNTER — TELEPHONE (OUTPATIENT)
Dept: OTOLARYNGOLOGY | Age: 73
End: 2023-04-07

## 2023-04-10 ENCOUNTER — TELEPHONE (OUTPATIENT)
Dept: OTOLARYNGOLOGY | Age: 73
End: 2023-04-10

## 2023-04-12 PROCEDURE — 96374 THER/PROPH/DIAG INJ IV PUSH: CPT

## 2023-04-12 PROCEDURE — 93010 ELECTROCARDIOGRAM REPORT: CPT

## 2023-04-12 PROCEDURE — 99285 EMERGENCY DEPT VISIT HI MDM: CPT

## 2023-04-13 ENCOUNTER — APPOINTMENT (OUTPATIENT)
Dept: CT IMAGING | Facility: HOSPITAL | Age: 73
End: 2023-04-13
Attending: EMERGENCY MEDICINE
Payer: MEDICARE

## 2023-04-13 ENCOUNTER — HOSPITAL ENCOUNTER (EMERGENCY)
Facility: HOSPITAL | Age: 73
Discharge: HOME | End: 2023-04-13
Attending: EMERGENCY MEDICINE
Payer: MEDICARE

## 2023-04-13 VITALS
BODY MASS INDEX: 28 KG/M2 | HEART RATE: 77 BPM | RESPIRATION RATE: 18 BRPM | OXYGEN SATURATION: 98 % | WEIGHT: 141.13 LBS | TEMPERATURE: 98 F | DIASTOLIC BLOOD PRESSURE: 77 MMHG | SYSTOLIC BLOOD PRESSURE: 151 MMHG

## 2023-04-13 DIAGNOSIS — R51.9 FACIAL PAIN: Primary | ICD-10-CM

## 2023-04-13 LAB
ALBUMIN SERPL-MCNC: 3.8 G/DL (ref 3.4–5)
ALBUMIN/GLOB SERPL: 1.2 {RATIO} (ref 1–2)
ALP LIVER SERPL-CCNC: 58 U/L
ALT SERPL-CCNC: 19 U/L
ANION GAP SERPL CALC-SCNC: 5 MMOL/L (ref 0–18)
AST SERPL-CCNC: 18 U/L (ref 15–37)
ATRIAL RATE: 76 BPM
BASOPHILS # BLD AUTO: 0.01 X10(3) UL (ref 0–0.2)
BASOPHILS NFR BLD AUTO: 0.1 %
BILIRUB SERPL-MCNC: 0.3 MG/DL (ref 0.1–2)
BUN BLD-MCNC: 20 MG/DL (ref 7–18)
CALCIUM BLD-MCNC: 9.1 MG/DL (ref 8.5–10.1)
CHLORIDE SERPL-SCNC: 110 MMOL/L (ref 98–112)
CO2 SERPL-SCNC: 26 MMOL/L (ref 21–32)
CREAT BLD-MCNC: 0.76 MG/DL
EOSINOPHIL # BLD AUTO: 0 X10(3) UL (ref 0–0.7)
EOSINOPHIL NFR BLD AUTO: 0 %
ERYTHROCYTE [DISTWIDTH] IN BLOOD BY AUTOMATED COUNT: 15 %
GFR SERPLBLD BASED ON 1.73 SQ M-ARVRAT: 84 ML/MIN/1.73M2 (ref 60–?)
GLOBULIN PLAS-MCNC: 3.1 G/DL (ref 2.8–4.4)
GLUCOSE BLD-MCNC: 106 MG/DL (ref 70–99)
HCT VFR BLD AUTO: 39.7 %
HGB BLD-MCNC: 12.6 G/DL
IMM GRANULOCYTES # BLD AUTO: 0.07 X10(3) UL (ref 0–1)
IMM GRANULOCYTES NFR BLD: 0.7 %
LYMPHOCYTES # BLD AUTO: 2.62 X10(3) UL (ref 1–4)
LYMPHOCYTES NFR BLD AUTO: 25.1 %
MCH RBC QN AUTO: 30.7 PG (ref 26–34)
MCHC RBC AUTO-ENTMCNC: 31.7 G/DL (ref 31–37)
MCV RBC AUTO: 96.6 FL
MONOCYTES # BLD AUTO: 0.56 X10(3) UL (ref 0.1–1)
MONOCYTES NFR BLD AUTO: 5.4 %
NEUTROPHILS # BLD AUTO: 7.17 X10 (3) UL (ref 1.5–7.7)
NEUTROPHILS # BLD AUTO: 7.17 X10(3) UL (ref 1.5–7.7)
NEUTROPHILS NFR BLD AUTO: 68.7 %
OSMOLALITY SERPL CALC.SUM OF ELEC: 295 MOSM/KG (ref 275–295)
P AXIS: 24 DEGREES
P-R INTERVAL: 138 MS
PLATELET # BLD AUTO: 354 10(3)UL (ref 150–450)
POTASSIUM SERPL-SCNC: 4 MMOL/L (ref 3.5–5.1)
PROT SERPL-MCNC: 6.9 G/DL (ref 6.4–8.2)
Q-T INTERVAL: 374 MS
QRS DURATION: 62 MS
QTC CALCULATION (BEZET): 420 MS
R AXIS: 2 DEGREES
RBC # BLD AUTO: 4.11 X10(6)UL
SODIUM SERPL-SCNC: 141 MMOL/L (ref 136–145)
T AXIS: 56 DEGREES
TROPONIN I HIGH SENSITIVITY: 4 NG/L
VENTRICULAR RATE: 76 BPM
WBC # BLD AUTO: 10.4 X10(3) UL (ref 4–11)

## 2023-04-13 PROCEDURE — 85025 COMPLETE CBC W/AUTO DIFF WBC: CPT | Performed by: EMERGENCY MEDICINE

## 2023-04-13 PROCEDURE — 80053 COMPREHEN METABOLIC PANEL: CPT | Performed by: EMERGENCY MEDICINE

## 2023-04-13 PROCEDURE — 84484 ASSAY OF TROPONIN QUANT: CPT | Performed by: EMERGENCY MEDICINE

## 2023-04-13 PROCEDURE — 70450 CT HEAD/BRAIN W/O DYE: CPT | Performed by: EMERGENCY MEDICINE

## 2023-04-13 PROCEDURE — 93005 ELECTROCARDIOGRAM TRACING: CPT

## 2023-04-13 RX ORDER — MORPHINE SULFATE 4 MG/ML
4 INJECTION, SOLUTION INTRAMUSCULAR; INTRAVENOUS ONCE
Status: COMPLETED | OUTPATIENT
Start: 2023-04-13 | End: 2023-04-13

## 2023-04-13 RX ORDER — IBUPROFEN 600 MG/1
600 TABLET ORAL ONCE
Status: COMPLETED | OUTPATIENT
Start: 2023-04-13 | End: 2023-04-13

## 2023-04-13 RX ORDER — HYDROCODONE BITARTRATE AND ACETAMINOPHEN 5; 325 MG/1; MG/1
1-2 TABLET ORAL EVERY 4 HOURS PRN
Qty: 12 TABLET | Refills: 0 | Status: SHIPPED | OUTPATIENT
Start: 2023-04-13

## 2023-04-13 NOTE — ED INITIAL ASSESSMENT (HPI)
Pt dx with URI and otitis a few weeks ago. Completed antibiotics and steroids. Pt has left ear hearing loss.

## 2023-04-13 NOTE — DISCHARGE INSTRUCTIONS
The cause of your facial pain is unclear at this time. There is nothing to suggest you are having significant cardiac or neurologic issues today. I recommend that you continue follow-up with your specialists and primary care doctor for further evaluation of this pain. I have prescribed you some Norco to help you with pain until you can meet with your specialists.

## 2023-04-14 ENCOUNTER — OFFICE VISIT (OUTPATIENT)
Dept: OTOLARYNGOLOGY | Age: 73
End: 2023-04-14

## 2023-04-14 ENCOUNTER — OFFICE VISIT (OUTPATIENT)
Dept: AUDIOLOGY | Age: 73
End: 2023-04-14

## 2023-04-14 DIAGNOSIS — H91.90 HEARING LOSS, UNSPECIFIED HEARING LOSS TYPE, UNSPECIFIED LATERALITY: ICD-10-CM

## 2023-04-14 DIAGNOSIS — H92.02 LEFT EAR PAIN: Primary | ICD-10-CM

## 2023-04-14 DIAGNOSIS — H92.02 REFERRED OTALGIA, LEFT: Primary | ICD-10-CM

## 2023-04-14 PROCEDURE — 92567 TYMPANOMETRY: CPT | Performed by: AUDIOLOGIST

## 2023-04-14 PROCEDURE — 99214 OFFICE O/P EST MOD 30 MIN: CPT | Performed by: PHYSICIAN ASSISTANT

## 2023-04-14 PROCEDURE — 92504 EAR MICROSCOPY EXAMINATION: CPT | Performed by: PHYSICIAN ASSISTANT

## 2023-04-14 RX ORDER — HYDROCODONE BITARTRATE AND ACETAMINOPHEN 5; 325 MG/1; MG/1
TABLET ORAL
COMMUNITY
Start: 2023-04-13

## 2023-05-01 ENCOUNTER — EXTERNAL RECORD (OUTPATIENT)
Dept: HEALTH INFORMATION MANAGEMENT | Facility: OTHER | Age: 73
End: 2023-05-01

## 2023-06-06 ENCOUNTER — APPOINTMENT (OUTPATIENT)
Dept: OTOLARYNGOLOGY | Age: 73
End: 2023-06-06

## 2023-08-08 ENCOUNTER — OFFICE VISIT (OUTPATIENT)
Dept: OTOLARYNGOLOGY | Age: 73
End: 2023-08-08

## 2023-08-08 ENCOUNTER — APPOINTMENT (OUTPATIENT)
Dept: AUDIOLOGY | Age: 73
End: 2023-08-08
Attending: OTOLARYNGOLOGY

## 2023-08-08 ENCOUNTER — OFFICE VISIT (OUTPATIENT)
Dept: AUDIOLOGY | Age: 73
End: 2023-08-08

## 2023-08-08 VITALS — HEIGHT: 59 IN | WEIGHT: 138 LBS | BODY MASS INDEX: 27.82 KG/M2

## 2023-08-08 DIAGNOSIS — H91.90 HEARING LOSS, UNSPECIFIED HEARING LOSS TYPE, UNSPECIFIED LATERALITY: ICD-10-CM

## 2023-08-08 DIAGNOSIS — H90.3 SENSORINEURAL HEARING LOSS (SNHL) OF BOTH EARS: Primary | ICD-10-CM

## 2023-08-08 DIAGNOSIS — H90.3 ASYMMETRIC SNHL (SENSORINEURAL HEARING LOSS): Primary | ICD-10-CM

## 2023-08-08 PROCEDURE — 92557 COMPREHENSIVE HEARING TEST: CPT | Performed by: AUDIOLOGIST

## 2023-08-08 PROCEDURE — 92567 TYMPANOMETRY: CPT | Performed by: AUDIOLOGIST

## 2023-08-08 PROCEDURE — 99214 OFFICE O/P EST MOD 30 MIN: CPT | Performed by: OTOLARYNGOLOGY

## 2023-08-08 PROCEDURE — G0268 REMOVAL OF IMPACTED WAX MD: HCPCS | Performed by: OTOLARYNGOLOGY

## 2023-09-07 ENCOUNTER — E-ADVICE (OUTPATIENT)
Dept: INTERNAL MEDICINE | Age: 73
End: 2023-09-07

## 2023-09-12 ENCOUNTER — TELEPHONE (OUTPATIENT)
Dept: INTERNAL MEDICINE | Age: 73
End: 2023-09-12

## 2023-11-04 ENCOUNTER — TELEPHONE (OUTPATIENT)
Dept: OTOLARYNGOLOGY | Age: 73
End: 2023-11-04

## 2024-03-13 ENCOUNTER — EXTERNAL LAB (OUTPATIENT)
Dept: HEALTH INFORMATION MANAGEMENT | Facility: OTHER | Age: 74
End: 2024-03-13

## 2024-03-13 LAB — DIAGNOSTIC TESTING SUMMARY: NEGATIVE

## 2024-05-17 ENCOUNTER — EXTERNAL RECORD (OUTPATIENT)
Dept: HEALTH INFORMATION MANAGEMENT | Facility: OTHER | Age: 74
End: 2024-05-17

## 2024-11-14 ENCOUNTER — IMAGING SERVICES (OUTPATIENT)
Dept: GENERAL RADIOLOGY | Age: 74
End: 2024-11-14
Attending: INTERNAL MEDICINE

## 2024-11-14 ENCOUNTER — E-ADVICE (OUTPATIENT)
Dept: INTERNAL MEDICINE | Age: 74
End: 2024-11-14

## 2024-11-14 ENCOUNTER — APPOINTMENT (OUTPATIENT)
Dept: INTERNAL MEDICINE | Age: 74
End: 2024-11-14

## 2024-11-14 VITALS
SYSTOLIC BLOOD PRESSURE: 130 MMHG | HEIGHT: 59 IN | OXYGEN SATURATION: 98 % | HEART RATE: 99 BPM | RESPIRATION RATE: 18 BRPM | DIASTOLIC BLOOD PRESSURE: 64 MMHG | TEMPERATURE: 97.1 F | BODY MASS INDEX: 28.63 KG/M2 | WEIGHT: 142 LBS

## 2024-11-14 DIAGNOSIS — M25.551 BILATERAL HIP PAIN: ICD-10-CM

## 2024-11-14 DIAGNOSIS — M54.2 NECK PAIN: ICD-10-CM

## 2024-11-14 DIAGNOSIS — E78.00 HYPERCHOLESTEROLEMIA: ICD-10-CM

## 2024-11-14 DIAGNOSIS — Z00.00 MEDICARE ANNUAL WELLNESS VISIT, SUBSEQUENT: Primary | ICD-10-CM

## 2024-11-14 DIAGNOSIS — Z12.11 SCREEN FOR COLON CANCER: Primary | ICD-10-CM

## 2024-11-14 DIAGNOSIS — M41.9 SCOLIOSIS OF THORACOLUMBAR SPINE, UNSPECIFIED SCOLIOSIS TYPE: ICD-10-CM

## 2024-11-14 DIAGNOSIS — G35 MULTIPLE SCLEROSIS  (CMD): ICD-10-CM

## 2024-11-14 DIAGNOSIS — M25.552 BILATERAL HIP PAIN: ICD-10-CM

## 2024-11-14 PROCEDURE — 73522 X-RAY EXAM HIPS BI 3-4 VIEWS: CPT | Performed by: RADIOLOGY

## 2024-11-14 PROCEDURE — 72050 X-RAY EXAM NECK SPINE 4/5VWS: CPT | Performed by: RADIOLOGY

## 2024-11-14 RX ORDER — ALENDRONATE SODIUM 70 MG/1
TABLET ORAL
COMMUNITY

## 2024-11-14 ASSESSMENT — PATIENT HEALTH QUESTIONNAIRE - PHQ9
2. FEELING DOWN, DEPRESSED OR HOPELESS: NOT AT ALL
SUM OF ALL RESPONSES TO PHQ9 QUESTIONS 1 AND 2: 0
1. LITTLE INTEREST OR PLEASURE IN DOING THINGS: NOT AT ALL
CLINICAL INTERPRETATION OF PHQ2 SCORE: NO FURTHER SCREENING NEEDED
SUM OF ALL RESPONSES TO PHQ9 QUESTIONS 1 AND 2: 0

## 2024-11-14 ASSESSMENT — COGNITIVE AND FUNCTIONAL STATUS - GENERAL
BECAUSE OF A PHYSICAL, MENTAL, OR EMOTIONAL CONDITION, DO YOU HAVE DIFFICULTY DOING ERRANDS ALONE: NO
BECAUSE OF A PHYSICAL, MENTAL, OR EMOTIONAL CONDITION, DO YOU HAVE SERIOUS DIFFICULTY CONCENTRATING, REMEMBERING OR MAKING DECISIONS: NO
DO YOU HAVE SERIOUS DIFFICULTY WALKING OR CLIMBING STAIRS: NO
DO YOU HAVE DIFFICULTY DRESSING OR BATHING: NO

## 2024-11-29 ENCOUNTER — OFFICE VISIT (OUTPATIENT)
Dept: INTERNAL MEDICINE | Age: 74
End: 2024-11-29

## 2024-11-29 ENCOUNTER — IMAGING SERVICES (OUTPATIENT)
Dept: GENERAL RADIOLOGY | Age: 74
End: 2024-11-29
Attending: INTERNAL MEDICINE

## 2024-11-29 VITALS
RESPIRATION RATE: 18 BRPM | HEART RATE: 102 BPM | DIASTOLIC BLOOD PRESSURE: 79 MMHG | OXYGEN SATURATION: 94 % | BODY MASS INDEX: 29.81 KG/M2 | WEIGHT: 142 LBS | SYSTOLIC BLOOD PRESSURE: 156 MMHG | HEIGHT: 58 IN

## 2024-11-29 DIAGNOSIS — J02.9 SORE THROAT: ICD-10-CM

## 2024-11-29 DIAGNOSIS — J06.9 VIRAL URI WITH COUGH: ICD-10-CM

## 2024-11-29 DIAGNOSIS — R09.81 SINUS CONGESTION: Primary | ICD-10-CM

## 2024-11-29 DIAGNOSIS — R09.81 SINUS CONGESTION: ICD-10-CM

## 2024-11-29 LAB
FLUAV RNA RESP QL NAA+PROBE: NOT DETECTED
FLUBV RNA RESP QL NAA+PROBE: NOT DETECTED
RSV AG NPH QL IA.RAPID: NOT DETECTED
S PYO DNA THROAT QL NAA+PROBE: NOT DETECTED
SARS-COV-2 RNA RESP QL NAA+PROBE: NOT DETECTED
SERVICE CMNT-IMP: NORMAL
SERVICE CMNT-IMP: NORMAL

## 2024-11-29 PROCEDURE — 70210 X-RAY EXAM OF SINUSES: CPT | Performed by: RADIOLOGY

## 2024-11-29 PROCEDURE — 99214 OFFICE O/P EST MOD 30 MIN: CPT | Performed by: INTERNAL MEDICINE

## 2024-11-29 PROCEDURE — 87651 STREP A DNA AMP PROBE: CPT | Performed by: INTERNAL MEDICINE

## 2024-11-29 RX ORDER — CETIRIZINE HYDROCHLORIDE 10 MG/1
10 TABLET ORAL DAILY
Qty: 30 TABLET | Refills: 1 | Status: SHIPPED | OUTPATIENT
Start: 2024-11-29

## 2024-11-29 RX ORDER — PSEUDOEPHEDRINE HCL 120 MG/1
120 TABLET, FILM COATED, EXTENDED RELEASE ORAL EVERY 12 HOURS
Qty: 14 TABLET | Refills: 0 | Status: SHIPPED | OUTPATIENT
Start: 2024-11-29

## 2024-11-29 RX ORDER — PSEUDOEPHEDRINE HCL 120 MG/1
120 TABLET, FILM COATED, EXTENDED RELEASE ORAL EVERY 12 HOURS
Qty: 30 TABLET | Refills: 1 | Status: SHIPPED | OUTPATIENT
Start: 2024-11-29 | End: 2024-11-29 | Stop reason: ALTCHOICE

## 2024-11-29 RX ORDER — LEVOFLOXACIN 500 MG/1
500 TABLET, FILM COATED ORAL DAILY
Qty: 5 TABLET | Refills: 0 | Status: SHIPPED | OUTPATIENT
Start: 2024-11-29 | End: 2024-12-04

## 2024-12-02 ENCOUNTER — APPOINTMENT (OUTPATIENT)
Dept: INTERNAL MEDICINE | Age: 74
End: 2024-12-02

## 2024-12-04 ENCOUNTER — OFFICE VISIT (OUTPATIENT)
Dept: INTERNAL MEDICINE | Age: 74
End: 2024-12-04

## 2024-12-04 VITALS
DIASTOLIC BLOOD PRESSURE: 70 MMHG | OXYGEN SATURATION: 94 % | RESPIRATION RATE: 18 BRPM | TEMPERATURE: 97.2 F | HEIGHT: 59 IN | HEART RATE: 104 BPM | WEIGHT: 139 LBS | SYSTOLIC BLOOD PRESSURE: 134 MMHG | BODY MASS INDEX: 28.02 KG/M2

## 2024-12-04 DIAGNOSIS — R09.81 NASAL SINUS CONGESTION: Primary | ICD-10-CM

## 2024-12-04 DIAGNOSIS — R05.8 PRODUCTIVE COUGH: ICD-10-CM

## 2024-12-04 RX ORDER — CLINDAMYCIN HYDROCHLORIDE 300 MG/1
300 CAPSULE ORAL 3 TIMES DAILY
Qty: 21 CAPSULE | Refills: 0 | Status: SHIPPED | OUTPATIENT
Start: 2024-12-04 | End: 2024-12-11

## 2024-12-04 RX ORDER — FLUTICASONE PROPIONATE 50 MCG
2 SPRAY, SUSPENSION (ML) NASAL DAILY
Qty: 16 G | Refills: 0 | Status: SHIPPED | OUTPATIENT
Start: 2024-12-04

## 2024-12-04 ASSESSMENT — PATIENT HEALTH QUESTIONNAIRE - PHQ9
CLINICAL INTERPRETATION OF PHQ2 SCORE: NO FURTHER SCREENING NEEDED
2. FEELING DOWN, DEPRESSED OR HOPELESS: NOT AT ALL
1. LITTLE INTEREST OR PLEASURE IN DOING THINGS: NOT AT ALL
SUM OF ALL RESPONSES TO PHQ9 QUESTIONS 1 AND 2: 0
SUM OF ALL RESPONSES TO PHQ9 QUESTIONS 1 AND 2: 0

## 2025-01-22 ENCOUNTER — TELEPHONE (OUTPATIENT)
Dept: GASTROENTEROLOGY | Age: 75
End: 2025-01-22

## 2025-01-30 ENCOUNTER — APPOINTMENT (OUTPATIENT)
Dept: GASTROENTEROLOGY | Age: 75
End: 2025-01-30

## 2025-01-30 ENCOUNTER — TELEPHONE (OUTPATIENT)
Dept: GASTROENTEROLOGY | Age: 75
End: 2025-01-30

## 2025-01-30 DIAGNOSIS — K21.9 GASTROESOPHAGEAL REFLUX DISEASE WITHOUT ESOPHAGITIS: Primary | ICD-10-CM

## 2025-01-30 DIAGNOSIS — K57.30 DIVERTICULOSIS OF LARGE INTESTINE WITHOUT PERFORATION OR ABSCESS WITHOUT BLEEDING: ICD-10-CM

## 2025-01-30 DIAGNOSIS — K63.5 HYPERPLASTIC COLONIC POLYP, UNSPECIFIED PART OF COLON: ICD-10-CM

## 2025-01-30 DIAGNOSIS — R13.10 DYSPHAGIA, UNSPECIFIED TYPE: ICD-10-CM

## 2025-01-30 DIAGNOSIS — K64.8 OTHER HEMORRHOIDS: ICD-10-CM

## 2025-01-30 DIAGNOSIS — Z86.0100 HISTORY OF COLONIC POLYPS: ICD-10-CM

## 2025-01-30 RX ORDER — LUBIPROSTONE 8 UG/1
8 CAPSULE ORAL 2 TIMES DAILY WITH MEALS
Qty: 60 CAPSULE | Refills: 3 | Status: SHIPPED | OUTPATIENT
Start: 2025-01-30 | End: 2025-01-31 | Stop reason: ALTCHOICE

## 2025-01-30 RX ORDER — SIMETHICONE 125 MG
TABLET,CHEWABLE ORAL
Qty: 2 TABLET | Refills: 0 | Status: SHIPPED | OUTPATIENT
Start: 2025-01-30 | End: 2025-03-16

## 2025-01-30 RX ORDER — POLYETHYLENE GLYCOL 3350, SODIUM SULFATE, POTASSIUM CHLORIDE, MAGNESIUM SULFATE, AND SODIUM CHLORIDE FOR ORAL SOLUTION 178.7-7.3G
1 KIT ORAL SEE ADMIN INSTRUCTIONS
Qty: 1 EACH | Refills: 0 | Status: SHIPPED | OUTPATIENT
Start: 2025-01-30

## 2025-01-30 RX ORDER — BISACODYL 5 MG/1
TABLET, DELAYED RELEASE ORAL
Qty: 2 TABLET | Refills: 0 | Status: SHIPPED | OUTPATIENT
Start: 2025-01-30 | End: 2025-03-16

## 2025-01-30 ASSESSMENT — ENCOUNTER SYMPTOMS
FEVER: 0
SHORTNESS OF BREATH: 0
NAUSEA: 0
ANAL BLEEDING: 0
APPETITE CHANGE: 0
DIAPHORESIS: 0
CONFUSION: 0
WEAKNESS: 0
CONSTIPATION: 0
DIARRHEA: 0
VOMITING: 0
CHILLS: 0
DIZZINESS: 0
ACTIVITY CHANGE: 0
ABDOMINAL DISTENTION: 0
ABDOMINAL PAIN: 0
BLOOD IN STOOL: 0
LIGHT-HEADEDNESS: 0
COLOR CHANGE: 0
VOICE CHANGE: 0
UNEXPECTED WEIGHT CHANGE: 0
COUGH: 0
FATIGUE: 0

## 2025-01-31 ENCOUNTER — TELEPHONE (OUTPATIENT)
Dept: GASTROENTEROLOGY | Age: 75
End: 2025-01-31

## 2025-02-01 PROBLEM — K57.30 DIVERTICULOSIS OF LARGE INTESTINE WITHOUT PERFORATION OR ABSCESS WITHOUT BLEEDING: Status: ACTIVE | Noted: 2025-02-01

## 2025-02-01 PROBLEM — K64.8 OTHER HEMORRHOIDS: Status: ACTIVE | Noted: 2025-02-01

## 2025-02-01 ASSESSMENT — ENCOUNTER SYMPTOMS: TROUBLE SWALLOWING: 1

## 2025-03-11 ENCOUNTER — E-ADVICE (OUTPATIENT)
Dept: GASTROENTEROLOGY | Age: 75
End: 2025-03-11

## 2025-03-24 ENCOUNTER — TELEPHONE (OUTPATIENT)
Dept: GASTROENTEROLOGY | Age: 75
End: 2025-03-24

## 2025-03-27 ENCOUNTER — HOSPITAL ENCOUNTER (OUTPATIENT)
Dept: GASTROENTEROLOGY | Age: 75
Discharge: HOME | End: 2025-03-27
Attending: INTERNAL MEDICINE

## 2025-03-27 ENCOUNTER — ANESTHESIA (OUTPATIENT)
Dept: GASTROENTEROLOGY | Age: 75
End: 2025-03-27

## 2025-03-27 ENCOUNTER — ANESTHESIA EVENT (OUTPATIENT)
Dept: GASTROENTEROLOGY | Age: 75
End: 2025-03-27

## 2025-03-27 VITALS
BODY MASS INDEX: 26.49 KG/M2 | WEIGHT: 131.39 LBS | HEART RATE: 77 BPM | OXYGEN SATURATION: 100 % | RESPIRATION RATE: 18 BRPM | HEIGHT: 59 IN | TEMPERATURE: 96.8 F | DIASTOLIC BLOOD PRESSURE: 71 MMHG | SYSTOLIC BLOOD PRESSURE: 129 MMHG

## 2025-03-27 DIAGNOSIS — K21.9 GASTROESOPHAGEAL REFLUX DISEASE WITHOUT ESOPHAGITIS: ICD-10-CM

## 2025-03-27 DIAGNOSIS — Z86.0100 HISTORY OF COLONIC POLYPS: ICD-10-CM

## 2025-03-27 DIAGNOSIS — R13.10 DYSPHAGIA, UNSPECIFIED TYPE: ICD-10-CM

## 2025-03-27 PROCEDURE — 10002807 HB RX 258: Performed by: INTERNAL MEDICINE

## 2025-03-27 PROCEDURE — C1726 CATH, BAL DIL, NON-VASCULAR: HCPCS

## 2025-03-27 PROCEDURE — C1889 IMPLANT/INSERT DEVICE, NOC: HCPCS

## 2025-03-27 PROCEDURE — 10002800 HB RX 250 W HCPCS: Performed by: ANESTHESIOLOGY

## 2025-03-27 PROCEDURE — 91040 ESOPH BALLOON DISTENSION TST: CPT

## 2025-03-27 PROCEDURE — 13000001 HB PHASE II RECOVERY EA 30 MINUTES

## 2025-03-27 PROCEDURE — 10006023 HB SUPPLY 272

## 2025-03-27 PROCEDURE — 13000029 HB GI MAJOR COMPLEX CASE EA ADD MINUTE

## 2025-03-27 PROCEDURE — 13000028 HB GI MAJOR COMPLEX CASE S/U + 1ST 15 MIN

## 2025-03-27 PROCEDURE — 10004451 HB PACU RECOVERY 1ST 30 MINUTES

## 2025-03-27 PROCEDURE — 13000008 HB ANESTHESIA MAC OUTSIDE OR

## 2025-03-27 RX ORDER — SODIUM CHLORIDE 9 MG/ML
INJECTION, SOLUTION INTRAVENOUS CONTINUOUS
Status: DISCONTINUED | OUTPATIENT
Start: 2025-03-27 | End: 2025-03-28 | Stop reason: HOSPADM

## 2025-03-27 RX ADMIN — SODIUM CHLORIDE: 9 INJECTION, SOLUTION INTRAVENOUS at 13:00

## 2025-03-27 RX ADMIN — PROPOFOL 100 MCG/KG/MIN: 10 INJECTION, EMULSION INTRAVENOUS at 13:02

## 2025-03-27 SDOH — SOCIAL STABILITY: SOCIAL NETWORK
HOW OFTEN DO YOU SEE OR TALK TO PEOPLE THAT YOU CARE ABOUT AND FEEL CLOSE TO? (FOR EXAMPLE: TALKING TO FRIENDS ON THE PHONE, VISITING FRIENDS OR FAMILY, GOING TO CHURCH OR CLUB MEETINGS): 3 TO 5 TIMES A WEEK

## 2025-03-27 ASSESSMENT — ACTIVITIES OF DAILY LIVING (ADL)
RECENT_DECLINE_ADL: NO
ADL_SCORE: 12
ADL_SHORT_OF_BREATH: NO
ADL_BEFORE_ADMISSION: INDEPENDENT

## 2025-03-27 ASSESSMENT — PAIN SCALES - GENERAL
PAINLEVEL_OUTOF10: 3
PAINLEVEL_OUTOF10: 0

## 2025-03-27 ASSESSMENT — ENCOUNTER SYMPTOMS: HEADACHES: 1

## 2025-03-27 ASSESSMENT — PAIN SCALES - WONG BAKER: WONGBAKER_NUMERICALRESPONSE: 0

## 2025-03-31 LAB
ASR DISCLAIMER: NORMAL
CASE RPRT: NORMAL
CLINICAL INFO: NORMAL
PATH REPORT.FINAL DX SPEC: NORMAL
PATH REPORT.GROSS SPEC: NORMAL

## 2025-04-14 ENCOUNTER — E-ADVICE (OUTPATIENT)
Dept: GASTROENTEROLOGY | Age: 75
End: 2025-04-14